# Patient Record
Sex: FEMALE | Race: WHITE | HISPANIC OR LATINO | Employment: UNEMPLOYED | URBAN - METROPOLITAN AREA
[De-identification: names, ages, dates, MRNs, and addresses within clinical notes are randomized per-mention and may not be internally consistent; named-entity substitution may affect disease eponyms.]

---

## 2024-01-19 ENCOUNTER — OFFICE VISIT (OUTPATIENT)
Dept: OBSTETRICS AND GYNECOLOGY | Facility: CLINIC | Age: 34
End: 2024-01-19
Payer: COMMERCIAL

## 2024-01-19 ENCOUNTER — LAB (OUTPATIENT)
Dept: LAB | Facility: LAB | Age: 34
End: 2024-01-19
Payer: COMMERCIAL

## 2024-01-19 VITALS
DIASTOLIC BLOOD PRESSURE: 76 MMHG | BODY MASS INDEX: 28.85 KG/M2 | HEIGHT: 64 IN | SYSTOLIC BLOOD PRESSURE: 134 MMHG | WEIGHT: 169 LBS

## 2024-01-19 DIAGNOSIS — Z34.91 FIRST TRIMESTER PREGNANCY (HHS-HCC): ICD-10-CM

## 2024-01-19 DIAGNOSIS — Z32.01 PREGNANCY TEST POSITIVE (HHS-HCC): ICD-10-CM

## 2024-01-19 DIAGNOSIS — Z32.00 ENCOUNTER FOR CONFIRMATION OF PREGNANCY TEST RESULT WITH PHYSICAL EXAMINATION: Primary | ICD-10-CM

## 2024-01-19 LAB
ABO GROUP (TYPE) IN BLOOD: NORMAL
ALBUMIN SERPL BCP-MCNC: 4.5 G/DL (ref 3.4–5)
ALP SERPL-CCNC: 58 U/L (ref 33–110)
ALT SERPL W P-5'-P-CCNC: 36 U/L (ref 7–45)
ANION GAP SERPL CALC-SCNC: 13 MMOL/L (ref 10–20)
ANTIBODY SCREEN: NORMAL
AST SERPL W P-5'-P-CCNC: 26 U/L (ref 9–39)
BILIRUB SERPL-MCNC: 0.4 MG/DL (ref 0–1.2)
BUN SERPL-MCNC: 17 MG/DL (ref 6–23)
CALCIUM SERPL-MCNC: 9.8 MG/DL (ref 8.6–10.6)
CHLORIDE SERPL-SCNC: 104 MMOL/L (ref 98–107)
CO2 SERPL-SCNC: 25 MMOL/L (ref 21–32)
CREAT SERPL-MCNC: 0.78 MG/DL (ref 0.5–1.05)
CRL: 14.6 MM
EGFRCR SERPLBLD CKD-EPI 2021: >90 ML/MIN/1.73M*2
ERYTHROCYTE [DISTWIDTH] IN BLOOD BY AUTOMATED COUNT: 12.3 % (ref 11.5–14.5)
GLUCOSE SERPL-MCNC: 92 MG/DL (ref 74–99)
HBV SURFACE AG SERPL QL IA: NONREACTIVE
HCT VFR BLD AUTO: 43.5 % (ref 36–46)
HCV AB SER QL: NONREACTIVE
HGB BLD-MCNC: 14.4 G/DL (ref 12–16)
HIV 1+2 AB+HIV1 P24 AG SERPL QL IA: NONREACTIVE
MCH RBC QN AUTO: 30.6 PG (ref 26–34)
MCHC RBC AUTO-ENTMCNC: 33.1 G/DL (ref 32–36)
MCV RBC AUTO: 93 FL (ref 80–100)
NRBC BLD-RTO: 0 /100 WBCS (ref 0–0)
PLATELET # BLD AUTO: 287 X10*3/UL (ref 150–450)
POTASSIUM SERPL-SCNC: 4.6 MMOL/L (ref 3.5–5.3)
PREGNANCY TEST URINE, POC: POSITIVE
PROT SERPL-MCNC: 6.7 G/DL (ref 6.4–8.2)
RBC # BLD AUTO: 4.7 X10*6/UL (ref 4–5.2)
REFLEX ADDED, ANEMIA PANEL: NORMAL
RH FACTOR (ANTIGEN D): NORMAL
RUBV IGG SERPL IA-ACNC: 2.6 IA
RUBV IGG SERPL QL IA: POSITIVE
SODIUM SERPL-SCNC: 137 MMOL/L (ref 136–145)
TREPONEMA PALLIDUM IGG+IGM AB [PRESENCE] IN SERUM OR PLASMA BY IMMUNOASSAY: NONREACTIVE
WBC # BLD AUTO: 8.6 X10*3/UL (ref 4.4–11.3)

## 2024-01-19 PROCEDURE — 36415 COLL VENOUS BLD VENIPUNCTURE: CPT

## 2024-01-19 PROCEDURE — 87389 HIV-1 AG W/HIV-1&-2 AB AG IA: CPT

## 2024-01-19 PROCEDURE — 85027 COMPLETE CBC AUTOMATED: CPT

## 2024-01-19 PROCEDURE — 87340 HEPATITIS B SURFACE AG IA: CPT

## 2024-01-19 PROCEDURE — 86901 BLOOD TYPING SEROLOGIC RH(D): CPT

## 2024-01-19 PROCEDURE — 86803 HEPATITIS C AB TEST: CPT

## 2024-01-19 PROCEDURE — 76817 TRANSVAGINAL US OBSTETRIC: CPT | Performed by: OBSTETRICS & GYNECOLOGY

## 2024-01-19 PROCEDURE — 86780 TREPONEMA PALLIDUM: CPT

## 2024-01-19 PROCEDURE — 81025 URINE PREGNANCY TEST: CPT | Performed by: OBSTETRICS & GYNECOLOGY

## 2024-01-19 PROCEDURE — 86317 IMMUNOASSAY INFECTIOUS AGENT: CPT

## 2024-01-19 PROCEDURE — 1036F TOBACCO NON-USER: CPT | Performed by: OBSTETRICS & GYNECOLOGY

## 2024-01-19 PROCEDURE — 86850 RBC ANTIBODY SCREEN: CPT

## 2024-01-19 PROCEDURE — 86900 BLOOD TYPING SEROLOGIC ABO: CPT

## 2024-01-19 PROCEDURE — 99203 OFFICE O/P NEW LOW 30 MIN: CPT | Performed by: OBSTETRICS & GYNECOLOGY

## 2024-01-19 PROCEDURE — 80053 COMPREHEN METABOLIC PANEL: CPT

## 2024-01-19 RX ORDER — CYANOCOBALAMIN (VITAMIN B-12) 250 MCG
250 TABLET ORAL DAILY
COMMUNITY

## 2024-01-19 RX ORDER — ACETAMINOPHEN 500 MG
TABLET ORAL
COMMUNITY

## 2024-01-19 NOTE — PROGRESS NOTES
ASSESSMENT/PLAN  Encounter for confirmation of pregnancy test result with physical examination    - US OB transvaginal. Today's ultrasound confirms a single viable gestation measuring 7 6/7 wks which is consistent with LMP dating of 8 0/7 wks.   - POCT pregnancy, urine manually resulted    Prenatal labs ordered  - CBC Anemia Panel With Reflex,Pregnancy; Future  - Hepatitis B Surface Antigen; Future  - Hepatitis C Antibody; Future  - HIV 1/2 Antigen/Antibody Screen with Reflex to Confirmation; Future  - Rubella Antibody, IgG; Future  - Syphilis Screen with Reflex; Future  - Type And Screen; Future  - Comprehensive Metabolic Panel; Future d/t h/o connective tissue.     RTO 2-3 wks for new OB visit.  Plan to start  mg daily at 12 weeks for h/o connective tissue disease.        SUBJECTIVE    HPI    32 yo  LMP 2023 8 0/7 wks presents for pregnancy confirmation. Cycles regular, no bleeding since LMP.  Some nausea.    Previous two pregnancies [facility].  section x 2.  First pregnancy  IOL 39 weeks persistent cat 2 tracing per op note.   pregnancy planned repeat c/s. Presented c/o ctx, sent home and returned with advanced dilation. Repeat LTCS.    Pt interested in TOLAC. We discussed if she really wants TOLAC after 2 c/s needs delivery at [facility]. We discussed risks, benefits. After discussion, pt elects repeat c/s this pregnancy.     H/o Sgrogens diagnosed 10 years ago. JATINDER+, SSA+  In past followed by rheumatology. Per pt took asa last pregnancy but no fetal growth issues or CTD effects on pregnancies in past per pt.      Review of Systems    Constitutional: no fever, no chills, + recent weight gain, no recent weight loss and + fatigue.   Eyes: no eye pain, no vision problems and no dryness of the eyes.   ENT: no hearing loss, no nosebleeds and no sinus congestion.   Cardiovascular: no chest pain, no palpitations and no orthopnea.   Respiratory: no shortness of breath, no cough and no  "wheezing.   Gastrointestinal: no abdominal pain, no constipation, + nausea, no diarrhea and no vomiting.   Genitourinary: no pelvic pain, no dysuria, no urinary incontinence, no vaginal dryness, no vaginal itching, no dyspareunia, no dysmenorrhea, no sexual problems, + urinary frequency, no vaginal discharge, no unexplained vaginal bleeding and no lesion/sore.   Musculoskeletal: no back pain, no joint swelling and no leg edema.   Integumentary: no rashes, no skin lesions, no breast pain, no nipple discharge and no breast lump.   Neurological: no headache, no numbness and no dizziness.   Psychiatric: no sleep disturbances, no anxiety and no depression.   Endocrine: no hot flashes, no loss of hair and no hirsutism.   Hematologic/Lymphatic: no swollen glands, no tendency for easy bleeding and no tendency for easy bruising.      OBJECTIVE    /76   Ht 1.626 m (5' 4\")   Wt 76.7 kg (169 lb)   LMP 11/24/2023 (Exact Date)   BMI 29.01 kg/m²     IO transvaginal ultrasound confirms single viable IUP CRL 1.46 cm 7 6/7 wks, + FCA, +YS  Physical Exam     Constitutional: Alert and in no acute distress. Well developed, well nourished   Abdomen: soft nontender; no abdominal mass palpated, no organomegaly and no hernias   Genitourinary: external genitalia: normal.   Uterus: Normal, mobile, nontender.  Right Adnexa/parametria: Normal.   Left Adnexa/parametria: Normal.   Psychiatric: alert and oriented x 3., affect normal to patient baseline and mood: appropriate       Yulisa Sheth MD  "

## 2024-01-19 NOTE — PATIENT INSTRUCTIONS
You were seen in the office today for confirmation of pregnancy. Your baby measured 7 6/7 wks on ultrasound which is consistent with your LMP dating.   Continue routine OB precautions at home  Continue taking prenatal vitamins. If you have not started prenatal vitamins you can start them now. Chewable and gummy prenatal vitamins are fine if it is difficult for you to swallow pills. Prenatal vitamins should have at least 800 mcg of Folic Acid to reduce the risk of neural tube/spinal defects in the baby  Routine prenatal lab work was ordered for you today and needs to be done prior to your next visit. These can be done at any  outpatient laboratory without an appointment, and do not require fasting.  Optional lab work to screen for genetic disorders can also be ordered. If these were discussed and/or ordered today the results will come separately from the rest of your routine labs, generally within 7-9 business days, and we will call you with these results. If you are still considering these labs we are happy to provide more information for review at home, and can answer any additional questions/order at the next office visit.  Avoid sick contacts and consider getting your Flu (available in office during flu season) and COVID vaccines to protect against infection in pregnancy  Make an appointment for a New OB visit in the office in the next 2-3 weeks  If you are having any concerns prior to your next visit please call the office to speak to the physician on call. This includes after hours, weekends, and holidays, when the answering service will be able to connect you with the physician on call. [phone] ([place] [facility]) or [phone] [place] [facility].

## 2024-02-04 RX ORDER — ASPIRIN 81 MG/1
162 TABLET ORAL DAILY
COMMUNITY

## 2024-02-06 ENCOUNTER — LAB (OUTPATIENT)
Dept: LAB | Facility: LAB | Age: 34
End: 2024-02-06
Payer: COMMERCIAL

## 2024-02-06 ENCOUNTER — INITIAL PRENATAL (OUTPATIENT)
Dept: OBSTETRICS AND GYNECOLOGY | Facility: CLINIC | Age: 34
End: 2024-02-06
Payer: COMMERCIAL

## 2024-02-06 VITALS — SYSTOLIC BLOOD PRESSURE: 128 MMHG | BODY MASS INDEX: 29.18 KG/M2 | DIASTOLIC BLOOD PRESSURE: 60 MMHG | WEIGHT: 170 LBS

## 2024-02-06 DIAGNOSIS — Z34.91 INITIAL OBSTETRIC VISIT, FIRST TRIMESTER (HHS-HCC): Primary | ICD-10-CM

## 2024-02-06 DIAGNOSIS — O35.9XX0 SUSPECTED FETAL ANOMALY, ANTEPARTUM, SINGLE OR UNSPECIFIED FETUS (HHS-HCC): ICD-10-CM

## 2024-02-06 DIAGNOSIS — Z36.9 FIRST TRIMESTER SCREENING (HHS-HCC): ICD-10-CM

## 2024-02-06 DIAGNOSIS — Z34.81 SUPERVISION OF NORMAL INTRAUTERINE PREGNANCY IN MULTIGRAVIDA IN FIRST TRIMESTER (HHS-HCC): ICD-10-CM

## 2024-02-06 DIAGNOSIS — Z3A.09 9 WEEKS GESTATION OF PREGNANCY (HHS-HCC): ICD-10-CM

## 2024-02-06 DIAGNOSIS — Z12.4 ROUTINE CERVICAL SMEAR: ICD-10-CM

## 2024-02-06 DIAGNOSIS — Z11.3 SCREEN FOR STD (SEXUALLY TRANSMITTED DISEASE): ICD-10-CM

## 2024-02-06 LAB
POC BLOOD, URINE: NEGATIVE
POC GLUCOSE, URINE: NEGATIVE MG/DL
POC KETONES, URINE: NEGATIVE MG/DL
POC LEUKOCYTES, URINE: ABNORMAL
POC NITRITE,URINE: NEGATIVE
POC PROTEIN, URINE: NEGATIVE MG/DL

## 2024-02-06 PROCEDURE — 87491 CHLMYD TRACH DNA AMP PROBE: CPT

## 2024-02-06 PROCEDURE — 36415 COLL VENOUS BLD VENIPUNCTURE: CPT

## 2024-02-06 PROCEDURE — 87800 DETECT AGNT MULT DNA DIREC: CPT

## 2024-02-06 PROCEDURE — 87591 N.GONORRHOEAE DNA AMP PROB: CPT

## 2024-02-06 PROCEDURE — 88175 CYTOPATH C/V AUTO FLUID REDO: CPT

## 2024-02-06 PROCEDURE — 0500F INITIAL PRENATAL CARE VISIT: CPT | Performed by: OBSTETRICS & GYNECOLOGY

## 2024-02-06 PROCEDURE — 87086 URINE CULTURE/COLONY COUNT: CPT

## 2024-02-06 PROCEDURE — 87624 HPV HI-RISK TYP POOLED RSLT: CPT

## 2024-02-06 NOTE — PROGRESS NOTES
Subjective   Patient ID [id number]   Shannon Wright is a 33 y.o.  at 10 4/7 wks with a working estimated date of delivery of 2024 who presents for an initial prenatal visit.   Overall feeling well.     Previous two pregnancies [facility].  section x 2.  First pregnancy  IOL 39 weeks persistent cat 2 tracing per op note.   pregnancy planned repeat c/s. Presented c/o ctx, sent home and returned with advanced dilation. Repeat LTCS.  After discussion last visit, pt elects repeat c/s this pregnancy.     H/o Sjogrens diagnosed 10 years ago. JATINDER+, SSA+  In past followed by rheumatology. Per pt took asa last pregnancy but no fetal growth issues or CTD effects on pregnancies in past per pt.    Her pregnancy is complicated by:  AMA at delivery  Prior c/s x 2  H/o Sjogrens     OB History    Para Term  AB Living   3 2 2 0 0 2   SAB IAB Ectopic Multiple Live Births   0 0 0 0 2      # Outcome Date GA Lbr John/2nd Weight Sex Delivery Anes PTL Lv   3 Current            2 Term 10/07/21 38w6d  3.58 kg M CS-LTranv Spinal N RACHEL   1 Term 19 39w1d  3.23 kg F CS-LTranv EPI N RACHEL          Objective   Physical Exam  See prenatal physical  Weight: 77.1 kg (170 lb)  Expected Total Weight Gain: 7 kg (15 lb)-11.5 kg (25 lb)   Pregravid BMI: 28.99  BP: 128/60    IO transvaginal ultrasound confirms single viable IUP CRL 10 6/7 wks, + FCA, + YS    Assessment/Plan   10 4/7 wks new OB visit.   AMA at term.   First trimester screening and second trimester screening discussed. Patient decided to proceed with first trimester NT ultrasound and NIPS, Prequel.   Pap/HPV test done today.  GC, CT, urine culture done today.   Normal prenatal labs reviewed.   mg starting 12 weeks.   Follow up in 4 weeks for return OB visit.    Yulisa Sheth MD

## 2024-02-06 NOTE — PATIENT INSTRUCTIONS
Welcome to prenatal care with [facility]!  You were seen in the office today for your initiation to prenatal care and had normal findings on exam  Continue routine OB precautions at home  Your labs were reviewed today and were normal. Routine pelvic cultures, urine culture, and pap smear (if you were due) were done today and you will be notified of the results  If you have had genetic screening labs done, we usually receive the results from allyve within 7-9 business days and we will call you with the results.   Continue taking prenatal vitamins   Avoid sick contacts and consider getting your Flu (available in office during flu season) and COVID vaccines to protect against infection in pregnancy    What to expect:  -    You will see each of our physicians at least once during your prenatal care. There are 5 physicians (Elizabeth Sheth, Narda, Paul, Ariel, and Evangelista) and our NP Eve Perry. We rotate OB call to be able to provide the best care to our patients during this important time, and we want to make sure you have had a chance to meet each physician prior to coming in for labor.   -    We will see you in the office every 4 weeks in the first two trimesters, every 2 weeks between 30 and 36 weeks gestation, and weekly following 36 weeks. Anatomy ultrasounds are done at [facility] around 20 weeks, gestational diabetes and anemia screening is done through outpatient labs between 25 and 28 weeks gestation, and labor and delivery preparations (ultrasound to confirm presentation, consent forms, etc) are done at 36 weeks in the office.   -    Visits can seem quick, but provide us with quite a bit of important information. So it is important to try not to miss any visits if possible and make up any cancelled appointments as soon as possible. Make sure you come to each visit with a full bladder because urine testing for bacteria, glucose, and protein are done at each visit. And although the OB visits may seem quick,  we are happy to take the time to answer any questions or discuss any concerns you may have  -    We deliver at [facility]: [address] [place], [address]  -    Birth, lactation, and parenting classes, as well as scheduling for hospital tours can be done through [email or web address].       Make an appointment for routine care in the office in the next 4 weeks  If you are having any bleeding, pain, severe nausea and/or vomiting, or any other concerns prior to your next visit, please call the office to speak to the physician on call. This includes after hours, weekends, and holidays, when the answering service will be able to connect you with the physician on call. [phone] ([place] [facility]) or [phone] ([place] [facility]).    Congratulations, we are excited to partner with you in the care of your pregnancy!

## 2024-02-07 LAB
BACTERIA UR CULT: NO GROWTH
C TRACH RRNA SPEC QL NAA+PROBE: NEGATIVE
N GONORRHOEA DNA SPEC QL PROBE+SIG AMP: NEGATIVE

## 2024-02-19 LAB — SCAN RESULT: NORMAL

## 2024-02-23 ENCOUNTER — HOSPITAL ENCOUNTER (OUTPATIENT)
Dept: RADIOLOGY | Facility: CLINIC | Age: 34
Discharge: HOME | End: 2024-02-23
Payer: COMMERCIAL

## 2024-02-23 ENCOUNTER — APPOINTMENT (OUTPATIENT)
Dept: RADIOLOGY | Facility: CLINIC | Age: 34
End: 2024-02-23
Payer: COMMERCIAL

## 2024-02-23 DIAGNOSIS — Z36.9 FIRST TRIMESTER SCREENING (HHS-HCC): ICD-10-CM

## 2024-02-23 PROCEDURE — 76813 OB US NUCHAL MEAS 1 GEST: CPT | Performed by: OBSTETRICS & GYNECOLOGY

## 2024-02-23 PROCEDURE — 76813 OB US NUCHAL MEAS 1 GEST: CPT

## 2024-03-06 ENCOUNTER — ROUTINE PRENATAL (OUTPATIENT)
Dept: OBSTETRICS AND GYNECOLOGY | Facility: CLINIC | Age: 34
End: 2024-03-06
Payer: COMMERCIAL

## 2024-03-06 VITALS — SYSTOLIC BLOOD PRESSURE: 100 MMHG | BODY MASS INDEX: 29.52 KG/M2 | WEIGHT: 172 LBS | DIASTOLIC BLOOD PRESSURE: 60 MMHG

## 2024-03-06 DIAGNOSIS — Z3A.14 14 WEEKS GESTATION OF PREGNANCY (HHS-HCC): ICD-10-CM

## 2024-03-06 DIAGNOSIS — Z34.82 MULTIGRAVIDA IN SECOND TRIMESTER (HHS-HCC): Primary | ICD-10-CM

## 2024-03-06 LAB
POC GLUCOSE, URINE: NEGATIVE MG/DL
POC PROTEIN, URINE: NEGATIVE MG/DL

## 2024-03-06 PROCEDURE — 0501F PRENATAL FLOW SHEET: CPT | Performed by: OBSTETRICS & GYNECOLOGY

## 2024-03-06 RX ORDER — FOLIC ACID 1 MG/1
TABLET ORAL DAILY
COMMUNITY

## 2024-03-06 NOTE — PROGRESS NOTES
Subjective   Patient ID [id number]   Shannon Wright is a 33 y.o.  at 14w5d with a working estimated date of delivery of 2024, by Last Menstrual Period who presents for a routine prenatal visit. She denies vaginal bleeding, leakage of fluid, decreased fetal movements, or contractions.    Her pregnancy is complicated by:  Sjogrens  LTCS x 2  AMA    Objective   Physical Exam  Weight: 78 kg (172 lb)  Expected Total Weight Gain: 7 kg (15 lb)-11.5 kg (25 lb)   Pregravid BMI: 28.99  BP: 100/60         Prenatal Labs  Urine dip:  Lab Results   Component Value Date    KETONESU NEGATIVE 2024       Lab Results   Component Value Date    HGB 14.4 2024    HCT 43.5 2024    ABO O 2024    HEPBSAG Nonreactive 2024           Imagin weeks for anatomy    Assessment/Plan   Problem List Items Addressed This Visit    None  Visit Diagnoses         Codes    Multigravida in second trimester    -  Primary Z34.82    Patient doing well  Precautions given  RTO 4 weeks     14 weeks gestation of pregnancy     Z3A.14    Relevant Orders    POCT UA Automated manually resulted (Completed)            Continue prenatal vitamin.  Labs reviewed.  Rhogam not indicated  GTT 25-28 weeks.    Follow up in 4 weeks for a routine prenatal visit.  Jenny Blake DO

## 2024-03-06 NOTE — PATIENT INSTRUCTIONS
You were seen in the office today for routine OB care and had normal findings on exam  Continue routine OB precautions at home  Continue taking prenatal vitamins   Avoid sick contacts and consider getting your Flu (available in office during flu season) and COVID vaccines to protect against infection in pregnancy  You will be given an order for your anatomy ultrasound. Please schedule at [facility] between 19 and 22 weeks gestation [phone]  You will be given a bottle of Glucola and orders for your second trimester labs. Please complete these between 25 and 28 weeks gestation. You may complete these at any  outpatient lab, and do not need an appointment  Make an appointment for routine care in the office in the next 4 weeks  If you are having any concerns prior to your next visit please call the office to speak to the physician on call. This includes after hours, weekends, and holidays, when the answering service will be able to connect you with the physician on call. [phone] ([place] [facility]) or [phone] [place].

## 2024-04-05 ENCOUNTER — HOSPITAL ENCOUNTER (OUTPATIENT)
Dept: RADIOLOGY | Facility: CLINIC | Age: 34
Discharge: HOME | End: 2024-04-05
Payer: COMMERCIAL

## 2024-04-05 ENCOUNTER — ROUTINE PRENATAL (OUTPATIENT)
Dept: OBSTETRICS AND GYNECOLOGY | Facility: CLINIC | Age: 34
End: 2024-04-05
Payer: COMMERCIAL

## 2024-04-05 VITALS — SYSTOLIC BLOOD PRESSURE: 122 MMHG | BODY MASS INDEX: 28.32 KG/M2 | WEIGHT: 165 LBS | DIASTOLIC BLOOD PRESSURE: 80 MMHG

## 2024-04-05 DIAGNOSIS — Z34.82 ENCOUNTER FOR SUPERVISION OF OTHER NORMAL PREGNANCY IN SECOND TRIMESTER (HHS-HCC): Primary | ICD-10-CM

## 2024-04-05 DIAGNOSIS — Z3A.19 19 WEEKS GESTATION OF PREGNANCY (HHS-HCC): ICD-10-CM

## 2024-04-05 DIAGNOSIS — O35.9XX0 SUSPECTED FETAL ANOMALY, ANTEPARTUM, SINGLE OR UNSPECIFIED FETUS (HHS-HCC): ICD-10-CM

## 2024-04-05 DIAGNOSIS — M35.09 SJOGREN'S SYNDROME WITH OTHER ORGAN INVOLVEMENT (MULTI): Primary | ICD-10-CM

## 2024-04-05 LAB
POC GLUCOSE, URINE: NEGATIVE MG/DL
POC PROTEIN, URINE: ABNORMAL MG/DL

## 2024-04-05 PROCEDURE — 76811 OB US DETAILED SNGL FETUS: CPT

## 2024-04-05 PROCEDURE — 76805 OB US >/= 14 WKS SNGL FETUS: CPT | Performed by: OBSTETRICS & GYNECOLOGY

## 2024-04-05 PROCEDURE — 0501F PRENATAL FLOW SHEET: CPT | Performed by: OBSTETRICS & GYNECOLOGY

## 2024-04-05 NOTE — PROGRESS NOTES
Subjective   Shannon Wright is a 33 y.o.  at 19w0d, presents for a routine prenatal visit.   Pts younger sister  suddenly and tragically in a car accident several weeks ago.     Objective     /80   Wt 74.8 kg (165 lb)   LMP 2023 (Exact Date)   BMI 28.32 kg/m²   Informal ultrasound + FCA    Plan  19 0/7 weeks  Anatomy ultrasound today.  Plan for fetal ECHO d/t h/o Sjogrens  RTO 4 weeks    Yulisa Sheth MD    4/10/2024   Left message for pt regarding positive anti SSA consistent with reported h/o Sjrogens.   Fetal ECHO ordered.

## 2024-04-09 ENCOUNTER — TELEPHONE (OUTPATIENT)
Dept: OBSTETRICS AND GYNECOLOGY | Facility: HOSPITAL | Age: 34
End: 2024-04-09
Payer: COMMERCIAL

## 2024-04-09 ENCOUNTER — LAB (OUTPATIENT)
Dept: LAB | Facility: LAB | Age: 34
End: 2024-04-09
Payer: COMMERCIAL

## 2024-04-09 DIAGNOSIS — M35.09 SJOGREN'S SYNDROME WITH OTHER ORGAN INVOLVEMENT (MULTI): ICD-10-CM

## 2024-04-09 LAB
ENA SS-A AB SER IA-ACNC: 7.2 AI
ENA SS-B AB SER IA-ACNC: <0.2 AI

## 2024-04-09 PROCEDURE — 86235 NUCLEAR ANTIGEN ANTIBODY: CPT

## 2024-04-09 PROCEDURE — 36415 COLL VENOUS BLD VENIPUNCTURE: CPT

## 2024-04-10 ENCOUNTER — TELEPHONE (OUTPATIENT)
Dept: OBSTETRICS AND GYNECOLOGY | Facility: HOSPITAL | Age: 34
End: 2024-04-10
Payer: COMMERCIAL

## 2024-04-12 ENCOUNTER — HOSPITAL ENCOUNTER (OUTPATIENT)
Dept: PEDIATRIC CARDIOLOGY | Facility: HOSPITAL | Age: 34
Discharge: HOME | End: 2024-04-12
Payer: COMMERCIAL

## 2024-04-12 ENCOUNTER — OFFICE VISIT (OUTPATIENT)
Dept: PEDIATRIC CARDIOLOGY | Facility: HOSPITAL | Age: 34
End: 2024-04-12
Payer: COMMERCIAL

## 2024-04-12 VITALS
HEIGHT: 64 IN | BODY MASS INDEX: 29.4 KG/M2 | HEART RATE: 87 BPM | SYSTOLIC BLOOD PRESSURE: 106 MMHG | DIASTOLIC BLOOD PRESSURE: 71 MMHG | WEIGHT: 172.18 LBS

## 2024-04-12 DIAGNOSIS — O28.3 ABNORMAL FETAL ULTRASOUND: ICD-10-CM

## 2024-04-12 DIAGNOSIS — O35.BXX0 ABNORMAL FETAL ECHOCARDIOGRAPHY AFFECTING ANTEPARTUM CARE OF MOTHER, SINGLE OR UNSPECIFIED FETUS (HHS-HCC): Primary | ICD-10-CM

## 2024-04-12 DIAGNOSIS — O35.8XX0 MATERNAL CARE FOR OTHER (SUSPECTED) FETAL ABNORMALITY AND DAMAGE, NOT APPLICABLE OR UNSPECIFIED (HHS-HCC): ICD-10-CM

## 2024-04-12 PROCEDURE — 99214 OFFICE O/P EST MOD 30 MIN: CPT | Performed by: PEDIATRICS

## 2024-04-12 PROCEDURE — 76827 ECHO EXAM OF FETAL HEART: CPT | Performed by: PEDIATRICS

## 2024-04-12 PROCEDURE — 99204 OFFICE O/P NEW MOD 45 MIN: CPT | Performed by: PEDIATRICS

## 2024-04-12 PROCEDURE — 93325 DOPPLER ECHO COLOR FLOW MAPG: CPT | Performed by: PEDIATRICS

## 2024-04-12 PROCEDURE — 76827 ECHO EXAM OF FETAL HEART: CPT

## 2024-04-12 NOTE — LETTER
April 15, 2024     Alejo Ponce MD  [id number]  [facility] [address]    Patient: Shannon Wright   YOB: 1990   Date of Visit: 2024       Dear Dr. Alejo Ponce MD:    Thank you for referring Shannon Wright to me for evaluation. Below are my notes for this consultation.  If you have questions, please do not hesitate to call me. I look forward to following your patient along with you.       Sincerely,     Ty Reyna MD      CC: Isabelle Franks, APRN-CNM, DNP  Amber Jones, APRN-CNP  Yulisa Sheth MD  ______________________________________________________________________________________    Shannon Wright was seen at the request of Alejo Ponce for a chief complaint of Sjogren syndrome and anti-SSA antibody positivity; a report with my findings is being sent via written or electronic means the referring physician with my recommendations for treatment.     I had the pleasure of seeing Shannon Wright in Pediatric Cardiology consultation at our [facility] location as part of our prenatal heart program for Sjogren syndrome and anti-SSA antibody positivity.  She is a 33 y.o. year-old  woman, currently 20w0d weeks gestation.  Patient's last menstrual period was 2023 (exact date). Estimated Date of Delivery: 24.  There have been no pregnancy complications.   She has not been hospitalized during this pregnancy.  She had a NIPT, which was normal.  She had a second trimester ultrasound which was normal.      Prior to the visit, I personally reviewed the cardiac portions of the obstetrical ultrasound performed on .  There is normal segmental anatomy with normal 4 chamber, outflow tract and 3 vessel views.  There is no evidence of septation defect, right or left ventricular outflow obstruction or significant valvular regurgitation.    Her previous obstetrical history is significant for 2 full term deliveries.  Her past medical history is significant for  "Sjogren syndrome anti-SSA antibody positivity.  She has no history of congenital heart disease, arrhythmia, cardiomyopathy, hypercholesterolemia, hypertension, diabetes, rheumatic heart disease, cancer, asthma, lupus, clotting disorder, depression, anxiety, alcohol abuse, phenylketonuria, or DiGeorge.  She has had 2 c-sections.  She takes Aspirin, vitamin D, B12, calcium, and folic acid.  She has No Known Allergies. She is currently taking prenatal vitamins.      Her family history is negative for early atherosclerosis, sudden cardiac death, long QT syndrome, cardiomyopathy, or metabolic disease.  Her father is 75yrs old and has an aortic aneurysm. She has a cousin that passed away from a PE and was found to have a clotting disorder. She has 2 maternal aunts with Sjogren syndrome and 1 maternal aunt with lupus. She has a cousin that has a 5 week baby that has a double aortic arch and recently had surgery at [facility].    She currently lives with her spouse and 2 children and is .  She works as a .  She does not smoke.  She denies illicit drug use or alcohol abuse.  She denies verbal, sexual, or physical abuse.     Delivery Hospital: [facility]  Father of the baby's name: same    /71 (BP Location: Right arm, Patient Position: Sitting, BP Cuff Size: Large adult)   Pulse 87   Ht 1.625 m (5' 3.98\")   Wt 78.1 kg (172 lb 2.9 oz)   LMP 11/24/2023 (Exact Date)   BMI 29.58 kg/m²     She was resting comfortably in the examination room and alert, active and in no respiratory distress. Skin was without rash.  HEENT: moist mucous membranes, no JVD, goiter. Breathing is not labored.  She was acyanotic.  There was no peripheral edema.   The abdomen was gravid, soft, nontender with normal bowel sounds.  The liver was not palpable.  The spleen tip was not palpable.  She had a normal gait and normal strength in all extremities.  Cranial nerves II - XII are intact.  She had no clubbing, cyanosis, or " edema.    A two-dimensional and Doppler fetal echocardiogram was performed today and interpreted by me at 20w0d weeks gestation.  The fetal echocardiogram showed normal segmental anatomy with no structural abnormalities found.  There is normal cardiac function.  There is no evidence of septation defect, right or left ventricular outflow obstruction or significant valvular regurgitation.  The fetal heart rate was within normal limits without ectopy or arrhythmia seen.  The spectral Doppler pattern across all valves, venous structures, and arterial structures was within normal limits.  There is no pericardial effusion.  Please see full report for details.    In summary, Shannon Wright is a 33 y.o. [id number], currently 20w0d weeks gestation, who had a normal fetal echocardiogram at today's visit.  She has a history of positive anti-SSA antibodies. She has normal active thyroid.  She has no other children with complete heart block.  There is an increased risk for fetal/ lupus in the setting of hypothyroid.  There is a significant risk of fetal heart block in the setting of a previous child with heart block.  Though somewhat controversial, the American Heart Association recently recommended every other week screening fetal echocardiograms between 16-28 weeks.  Given the history, I recommend repeat fetal echocardiography every other week.  We did not prescribe any medications.  We did not recommend intervention.  As always, we recommend a heart healthy lifestyle.  She does not necessarily need to follow up with pediatric cardiology after the baby is born unless the pediatric team has any concerns or worries.     The exact prevalence of symptomatic or asymptomatic maternal autoantibody (anti-Ro/SSA or anti-La/SSB) positivity in the general population is unknown. In prospectively examined pregnancies of mothers with known antibodies and no prior affected child, the reported incidence of fetal CHB was between 1%  and 5%. The number of affected pregnancies increases to 11% to 19% for those with a previously affected child with CHB.  In addition, women with both autoantibodies and hypothyroidism are at a 9-fold increased risk of having an affected fetus or  compared with those with SSA or SSB alone.   In addition to abnormalities in the conduction system, up to 10% to 15% of SSA-exposed fetuses with conduction system disease may also develop myocardial inflammation, endocardial fibroelastosis, or atrioventricular (AV) valve apparatus dysfunction.  Because of the perception that the inflammatory effects resulting from antibody exposure may be preventable if detected and treated at an early stage, it has been recommended that SSA/SSB-positive women be referred for fetal echocardiography surveillance beginning in the early second trimester (16-18 weeks). The mechanical NY interval has been measured in fetuses at risk with the use of a variety of M-mode and pulsed Doppler techniques and compared with gestational age-adjusted normal values. Although the value of serial assessment for the detection of the progression of myocardial inflammation or conduction system disease from first-degree block (NY prolongation) to CHB has not been proved, serial assessment at 1- to 2-week intervals starting at 16 weeks and continuing through 28 weeks of gestation is reasonable to perform because the potential benefits outweigh the risks. For women who have had a previously affected child, more frequent serial assessment, at least weekly, is recommended. (Claire M, et al. Circulation. 2014;129:1-58)     LOC: 0  Normal fetal echocardiogram within the limitations of ultrasound. No changes were made to current delivery plan. Triage code 0:  Delivery per OB at patient's preferred hospital.  Standard  care per  team.  Cardiology consult not necessary, unless there are clinical concerns.       Thank you for allowing me to  participate in Shannon's care.  If you have any further questions, please do not hesitate to contact me.     Ty Reyna M.D.  [facility], Director  Ambulatory Pediatric Cardiology   Division of Pediatric Cardiology  [facility]  [facility]   of Pediatrics, [facility]  [facility] -   [address] [place] [address]  [place] [id number]  Office:  [phone]  Fax:       [phone]  e-mail:  [email or web address]    I spent greater than 45 minutes in performance of this consultation, of which greater than 50% was related to coordination of care or counseling.

## 2024-04-12 NOTE — PATIENT INSTRUCTIONS
Women with SSa Antibodies are at risk for having babies with complete heart block.  Complete heart block (aka CHB) happens because antibodies attack the electrical system of the heart.  This results in the upper and lower chambers not talking with each other and beating at different times.  There is no sign of heart block at this time.  SSa antibodies are also associated with leakage of the heart valves (regurgitation), brightness and scarring of the pumping chambers (endocardiofibroelastosis), decreased squeeze (ventricular dysfunction) or fluid around the heart (pericardial effusion).  Your fetus showed none of these findings. You had a normal fetal echocardiogram today.  The baby's heart is built normally.  The baby's heart rhythm is normal.  The function of the heart is normal.      There is some controversy about whether or not it is appropriate to have repeated fetal echocardiograms on women with SSA/SSB anti-bodies.  The American Heart Association suggests that it is reasonable to do either weekly or every other week fetal echocardiograms to look for congenital heart block.    The risk of developing heart block is higher if you had a previous child with heart block or if you have hypothyroid or vitamin D deficiency.  At this point I recommend weekly Doppler auscultation of the fetal heart at your OB's office until 28 weeks and a repeat fetal echocardiogram in 2 weeks.  Otherwise, you can deliver at the hospital of your choosing.  The baby does not need to see a cardiologist after birth unless the pediatric team has any concerns.      Sometimes it is not possible to see all the heart structures because of the position or size of the fetus. This does not mean they are not there, but may mean that for technical reasons they cannot be assessed. Sometimes this information may not be important; while in some cases it means that definite answers are not possible. The echocardiographer will discuss this with you  if necessary, and repeat studies are frequently performed later in the pregnancy. Certain congenital heart abnormalities are also hard to detect by fetal echocardiograms, but often these are simple abnormalities. We do not mention this to concern you, but rather that you understand that there are technical limitations to such studies.   It remains important that your pediatrician provides a normal careful medical examination of the baby (including the heart) takes place after birth, and if there were any suspicious cardiac findings, that these are evaluated in the usual way irrespective of the findings at fetal study. Certain communications between the two sides of the circulation are normally present in all developing babies and normally close after birth. We are not able to tell in advance whether this will occur, however there is only a tiny chance that they will not. Persistence of these structures is generally not a difficult problem to deal with.     We direct our attention only to the heart, where we have special expertise. This is not the same as your general obstetric ultrasound scan. Other ultrasound information about the fetus can be obtained from an obstetric ultrasonographer or your obstetrician.

## 2024-04-12 NOTE — PROGRESS NOTES
Shannon Wright was seen at the request of Alejo Ponce for a chief complaint of Sjogren syndrome and anti-SSA antibody positivity; a report with my findings is being sent via written or electronic means the referring physician with my recommendations for treatment.     I had the pleasure of seeing Shannon Wright in Pediatric Cardiology consultation at our [facility] location as part of our prenatal heart program for Sjogren syndrome and anti-SSA antibody positivity.  She is a 33 y.o. year-old  woman, currently 20w0d weeks gestation.  Patient's last menstrual period was 2023 (exact date). Estimated Date of Delivery: 24.  There have been no pregnancy complications.   She has not been hospitalized during this pregnancy.  She had a NIPT, which was normal.  She had a second trimester ultrasound which was normal.      Prior to the visit, I personally reviewed the cardiac portions of the obstetrical ultrasound performed on .  There is normal segmental anatomy with normal 4 chamber, outflow tract and 3 vessel views.  There is no evidence of septation defect, right or left ventricular outflow obstruction or significant valvular regurgitation.    Her previous obstetrical history is significant for 2 full term deliveries.  Her past medical history is significant for Sjogren syndrome anti-SSA antibody positivity.  She has no history of congenital heart disease, arrhythmia, cardiomyopathy, hypercholesterolemia, hypertension, diabetes, rheumatic heart disease, cancer, asthma, lupus, clotting disorder, depression, anxiety, alcohol abuse, phenylketonuria, or DiGeorge.  She has had 2 c-sections.  She takes Aspirin, vitamin D, B12, calcium, and folic acid.  She has No Known Allergies. She is currently taking prenatal vitamins.      Her family history is negative for early atherosclerosis, sudden cardiac death, long QT syndrome, cardiomyopathy, or metabolic disease.  Her father is 75yrs old and has an aortic  "aneurysm. She has a cousin that passed away from a PE and was found to have a clotting disorder. She has 2 maternal aunts with Sjogren syndrome and 1 maternal aunt with lupus. She has a cousin that has a 5 week baby that has a double aortic arch and recently had surgery at [facility].    She currently lives with her spouse and 2 children and is .  She works as a .  She does not smoke.  She denies illicit drug use or alcohol abuse.  She denies verbal, sexual, or physical abuse.     Delivery [facility]  Father of the baby's name: same    /71 (BP Location: Right arm, Patient Position: Sitting, BP Cuff Size: Large adult)   Pulse 87   Ht 1.625 m (5' 3.98\")   Wt 78.1 kg (172 lb 2.9 oz)   LMP 11/24/2023 (Exact Date)   BMI 29.58 kg/m²     She was resting comfortably in the examination room and alert, active and in no respiratory distress. Skin was without rash.  HEENT: moist mucous membranes, no JVD, goiter. Breathing is not labored.  She was acyanotic.  There was no peripheral edema.   The abdomen was gravid, soft, nontender with normal bowel sounds.  The liver was not palpable.  The spleen tip was not palpable.  She had a normal gait and normal strength in all extremities.  Cranial nerves II - XII are intact.  She had no clubbing, cyanosis, or edema.    A two-dimensional and Doppler fetal echocardiogram was performed today and interpreted by me at 20w0d weeks gestation.  The fetal echocardiogram showed normal segmental anatomy with no structural abnormalities found.  There is normal cardiac function.  There is no evidence of septation defect, right or left ventricular outflow obstruction or significant valvular regurgitation.  The fetal heart rate was within normal limits without ectopy or arrhythmia seen.  The spectral Doppler pattern across all valves, venous structures, and arterial structures was within normal limits.  There is no pericardial effusion.  Please see full report for " details.    In summary, Shannon Wright is a 33 y.o. [id number], currently 20w0d weeks gestation, who had a normal fetal echocardiogram at today's visit.  She has a history of positive anti-SSA antibodies. She has normal active thyroid.  She has no other children with complete heart block.  There is an increased risk for fetal/ lupus in the setting of hypothyroid.  There is a significant risk of fetal heart block in the setting of a previous child with heart block.  Though somewhat controversial, the American Heart Association recently recommended every other week screening fetal echocardiograms between 16-28 weeks.  Given the history, I recommend repeat fetal echocardiography every other week.  We did not prescribe any medications.  We did not recommend intervention.  As always, we recommend a heart healthy lifestyle.  She does not necessarily need to follow up with pediatric cardiology after the baby is born unless the pediatric team has any concerns or worries.     The exact prevalence of symptomatic or asymptomatic maternal autoantibody (anti-Ro/SSA or anti-La/SSB) positivity in the general population is unknown. In prospectively examined pregnancies of mothers with known antibodies and no prior affected child, the reported incidence of fetal CHB was between 1% and 5%. The number of affected pregnancies increases to 11% to 19% for those with a previously affected child with CHB.  In addition, women with both autoantibodies and hypothyroidism are at a 9-fold increased risk of having an affected fetus or  compared with those with SSA or SSB alone.   In addition to abnormalities in the conduction system, up to 10% to 15% of SSA-exposed fetuses with conduction system disease may also develop myocardial inflammation, endocardial fibroelastosis, or atrioventricular (AV) valve apparatus dysfunction.  Because of the perception that the inflammatory effects resulting from antibody exposure may be  preventable if detected and treated at an early stage, it has been recommended that SSA/SSB-positive women be referred for fetal echocardiography surveillance beginning in the early second trimester (16-18 weeks). The mechanical AR interval has been measured in fetuses at risk with the use of a variety of M-mode and pulsed Doppler techniques and compared with gestational age-adjusted normal values. Although the value of serial assessment for the detection of the progression of myocardial inflammation or conduction system disease from first-degree block (AR prolongation) to CHB has not been proved, serial assessment at 1- to 2-week intervals starting at 16 weeks and continuing through 28 weeks of gestation is reasonable to perform because the potential benefits outweigh the risks. For women who have had a previously affected child, more frequent serial assessment, at least weekly, is recommended. (Claire M, et al. Circulation. 2014;129:1-58)     LOC: 0  Normal fetal echocardiogram within the limitations of ultrasound. No changes were made to current delivery plan. Triage code 0:  Delivery per OB at patient's preferred hospital.  Standard  care per  team.  Cardiology consult not necessary, unless there are clinical concerns.       Thank you for allowing me to participate in Shannon's care.  If you have any further questions, please do not hesitate to contact me.     Ty Reyna M.D.  [facility], Director  Ambulatory Pediatric Cardiology   Division of Pediatric Cardiology  [facility]  [facility]   [facility] Pediatrics, [employer]  [facility] -   [address] [place] [address]  [place] [id number]  Office:  [phone]  Fax:       [phone]  e-mail:  [email or web address]    I spent greater than 45 minutes in performance of this  consultation, of which greater than 50% was related to coordination of care or counseling.

## 2024-04-23 ENCOUNTER — HOSPITAL ENCOUNTER (OUTPATIENT)
Dept: RADIOLOGY | Facility: CLINIC | Age: 34
Discharge: HOME | End: 2024-04-23
Payer: COMMERCIAL

## 2024-04-23 DIAGNOSIS — Z36.9 FIRST TRIMESTER SCREENING (HHS-HCC): ICD-10-CM

## 2024-04-23 PROCEDURE — 76816 OB US FOLLOW-UP PER FETUS: CPT | Performed by: OBSTETRICS & GYNECOLOGY

## 2024-04-23 PROCEDURE — 76816 OB US FOLLOW-UP PER FETUS: CPT

## 2024-04-25 ENCOUNTER — ANCILLARY PROCEDURE (OUTPATIENT)
Dept: PEDIATRIC CARDIOLOGY | Facility: CLINIC | Age: 34
End: 2024-04-25
Payer: COMMERCIAL

## 2024-04-25 ENCOUNTER — OFFICE VISIT (OUTPATIENT)
Dept: PEDIATRIC CARDIOLOGY | Facility: CLINIC | Age: 34
End: 2024-04-25
Payer: COMMERCIAL

## 2024-04-25 VITALS
HEART RATE: 90 BPM | SYSTOLIC BLOOD PRESSURE: 109 MMHG | WEIGHT: 174.16 LBS | HEIGHT: 64 IN | DIASTOLIC BLOOD PRESSURE: 69 MMHG | BODY MASS INDEX: 29.73 KG/M2

## 2024-04-25 DIAGNOSIS — O35.8XX0 MATERNAL CARE FOR OTHER (SUSPECTED) FETAL ABNORMALITY AND DAMAGE, NOT APPLICABLE OR UNSPECIFIED (HHS-HCC): ICD-10-CM

## 2024-04-25 DIAGNOSIS — O35.BXX0 ABNORMAL FETAL ECHOCARDIOGRAPHY AFFECTING ANTEPARTUM CARE OF MOTHER, SINGLE OR UNSPECIFIED FETUS (HHS-HCC): ICD-10-CM

## 2024-04-25 DIAGNOSIS — R76.8 SS-A ANTIBODY POSITIVE: Primary | ICD-10-CM

## 2024-04-25 LAB — BODY SURFACE AREA: 1.89 M2

## 2024-04-25 PROCEDURE — 99215 OFFICE O/P EST HI 40 MIN: CPT | Performed by: PEDIATRICS

## 2024-04-25 PROCEDURE — 93325 DOPPLER ECHO COLOR FLOW MAPG: CPT | Performed by: PEDIATRICS

## 2024-04-25 PROCEDURE — 76827 ECHO EXAM OF FETAL HEART: CPT | Performed by: PEDIATRICS

## 2024-04-25 NOTE — PROGRESS NOTES
Shannon Wright was seen at the request of Alejo Ponce MD for a chief complaint of Sjogren syndrome and anti-SSA antibody positivity; a report with my findings is being sent via written or electronic means the referring physician with my recommendations for treatment.     I had the pleasure of seeing Shannon Wright in Pediatric Cardiology consultation at our [facility] location as part of our prenatal heart program for Sjogren syndrome and anti-SSA antibody positivity.  She is a 34 y.o. year-old  woman, currently 21w6d weeks gestation.  Patient's last menstrual period was 2023 (exact date). Estimated Date of Delivery: 24.  There have been no pregnancy complications.   She has not been hospitalized during this pregnancy.  She had a NIPT, which was normal.  She had a second trimester ultrasound which was normal.      Prior to the visit, I personally reviewed the cardiac portions of the obstetrical ultrasound performed on .  There is normal segmental anatomy with normal 4 chamber, outflow tract and 3 vessel views.  There is no evidence of septation defect, right or left ventricular outflow obstruction or significant valvular regurgitation.    Her previous obstetrical history is significant for 2 full term deliveries.  Her past medical history is significant for Sjogren syndrome anti-SSA antibody positivity.  She has no history of congenital heart disease, arrhythmia, cardiomyopathy, hypercholesterolemia, hypertension, diabetes, rheumatic heart disease, cancer, asthma, lupus, clotting disorder, depression, anxiety, alcohol abuse, phenylketonuria, or DiGeorge.  She has had 2 c-sections.  She takes Aspirin, vitamin D, B12, calcium, and folic acid.  She has No Known Allergies. She is currently taking prenatal vitamins.      Her family history is negative for early atherosclerosis, sudden cardiac death, long QT syndrome, cardiomyopathy, or metabolic disease.  Her father is 75yrs old and has an  "aortic aneurysm. She has a cousin that passed away from a PE and was found to have a clotting disorder. She has 2 maternal aunts with Sjogren syndrome and 1 maternal aunt with lupus. She has a cousin that has a 5 week baby that has a double aortic arch and recently had surgery at [facility].    She currently lives with her spouse and 2 children and is .  She works as a .  She does not smoke.  She denies illicit drug use or alcohol abuse.  She denies verbal, sexual, or physical abuse.     Delivery [facility]: [facility]  Father of the baby's name: same    /69 (BP Location: Right arm, Patient Position: Sitting)   Pulse 90   Ht 1.629 m (5' 4.13\")   Wt 79 kg (174 lb 2.6 oz)   LMP 11/24/2023 (Exact Date)   BMI 29.77 kg/m²     She was resting comfortably in the examination room and alert, active and in no respiratory distress. Skin was without rash.  HEENT: moist mucous membranes, no JVD, goiter. Breathing is not labored.  She was acyanotic.  There was no peripheral edema.   The abdomen was gravid, soft, nontender with normal bowel sounds.  The liver was not palpable.  The spleen tip was not palpable.  She had a normal gait and normal strength in all extremities.  Cranial nerves II - XII are intact.  She had no clubbing, cyanosis, or edema.    A two-dimensional and Doppler fetal echocardiogram was performed today and interpreted by me at 21w6d weeks gestation.  The fetal echocardiogram showed normal segmental anatomy with no structural abnormalities found.  There is normal cardiac function.  There is no evidence of septation defect, right or left ventricular outflow obstruction or significant valvular regurgitation.  The fetal heart rate was within normal limits without ectopy or arrhythmia seen.  The spectral Doppler pattern across all valves, venous structures, and arterial structures was within normal limits.  There is no pericardial effusion.  Please see full report for details.    In summary, " Shannon Wright is a 34 y.o. [id number], currently 21w6d weeks gestation, who had a normal fetal echocardiogram at today's visit.  She has a history of positive anti-SSA antibodies. She has normal active thyroid.  She has no other children with complete heart block.  There is an increased risk for fetal/ lupus in the setting of hypothyroid.  There is a significant risk of fetal heart block in the setting of a previous child with heart block.  Though somewhat controversial, the American Heart Association recently recommended every other week screening fetal echocardiograms between 16-28 weeks.  Given the history, I recommend repeat fetal echocardiography every other week.  We did not prescribe any medications.  We did not recommend intervention.  As always, we recommend a heart healthy lifestyle.  She does not necessarily need to follow up with pediatric cardiology after the baby is born unless the pediatric team has any concerns or worries.     The exact prevalence of symptomatic or asymptomatic maternal autoantibody (anti-Ro/SSA or anti-La/SSB) positivity in the general population is unknown. In prospectively examined pregnancies of mothers with known antibodies and no prior affected child, the reported incidence of fetal CHB was between 1% and 5%. The number of affected pregnancies increases to 11% to 19% for those with a previously affected child with CHB.  In addition, women with both autoantibodies and hypothyroidism are at a 9-fold increased risk of having an affected fetus or  compared with those with SSA or SSB alone.   In addition to abnormalities in the conduction system, up to 10% to 15% of SSA-exposed fetuses with conduction system disease may also develop myocardial inflammation, endocardial fibroelastosis, or atrioventricular (AV) valve apparatus dysfunction.  Because of the perception that the inflammatory effects resulting from antibody exposure may be preventable if detected and  treated at an early stage, it has been recommended that SSA/SSB-positive women be referred for fetal echocardiography surveillance beginning in the early second trimester (16-18 weeks). The mechanical OR interval has been measured in fetuses at risk with the use of a variety of M-mode and pulsed Doppler techniques and compared with gestational age-adjusted normal values. Although the value of serial assessment for the detection of the progression of myocardial inflammation or conduction system disease from first-degree block (OR prolongation) to CHB has not been proved, serial assessment at 1- to 2-week intervals starting at 16 weeks and continuing through 28 weeks of gestation is reasonable to perform because the potential benefits outweigh the risks. For women who have had a previously affected child, more frequent serial assessment, at least weekly, is recommended. (Claire M, et al. Circulation. 2014;129:1-58)     LOC: 0  Normal fetal echocardiogram within the limitations of ultrasound. No changes were made to current delivery plan. Triage code 0:  Delivery per OB at patient's preferred hospital.  Standard  care per  team.  Cardiology consult not necessary, unless there are clinical concerns.       Thank you for allowing me to participate in [facility] care.  If you have any further questions, please do not hesitate to contact me.     Ty Reyna M.D.  [facility], Director  Ambulatory Pediatric Cardiology   Division of Pediatric Cardiology  [facility]  [facility]   [facility] Pediatrics, [employer]  [facility] -   [address] [place] [address]  [place] [id number]  Office:  [phone]  Fax:       [phone]  e-mail:  [email or web address]    I spent greater than 45 minutes in performance of this consultation, of which  greater than 50% was related to coordination of care or counseling.

## 2024-04-25 NOTE — LETTER
2024     Alejo Ponce MD  [address]  [facility] [address]    Patient: Shannon Wright   YOB: 1990   Date of Visit: 2024       Dear Dr. Alejo Ponce MD:    Thank you for referring Shannon Wright to me for evaluation. Below are my notes for this consultation.  If you have questions, please do not hesitate to call me. I look forward to following your patient along with you.       Sincerely,     Ty Reyna MD      CC: sIabelle Franks, APRN-CNM, DNP  Amber Jones, APRN-CNP  Yulisa Sheth MD  ______________________________________________________________________________________    Shannon Wright was seen at the request of Alejo Ponce MD for a chief complaint of Sjogren syndrome and anti-SSA antibody positivity; a report with my findings is being sent via written or electronic means the referring physician with my recommendations for treatment.     I had the pleasure of seeing Shannon Wright in Pediatric Cardiology consultation at our [facility] location as part of our prenatal heart program for Sjogren syndrome and anti-SSA antibody positivity.  She is a 34 y.o. year-old  woman, currently 21w6d weeks gestation.  Patient's last menstrual period was 2023 (exact date). Estimated Date of Delivery: 24.  There have been no pregnancy complications.   She has not been hospitalized during this pregnancy.  She had a NIPT, which was normal.  She had a second trimester ultrasound which was normal.      Prior to the visit, I personally reviewed the cardiac portions of the obstetrical ultrasound performed on .  There is normal segmental anatomy with normal 4 chamber, outflow tract and 3 vessel views.  There is no evidence of septation defect, right or left ventricular outflow obstruction or significant valvular regurgitation.    Her previous obstetrical history is significant for 2 full term deliveries.  Her past medical history is significant  "for Sjogren syndrome anti-SSA antibody positivity.  She has no history of congenital heart disease, arrhythmia, cardiomyopathy, hypercholesterolemia, hypertension, diabetes, rheumatic heart disease, cancer, asthma, lupus, clotting disorder, depression, anxiety, alcohol abuse, phenylketonuria, or DiGeorge.  She has had 2 c-sections.  She takes Aspirin, vitamin D, B12, calcium, and folic acid.  She has No Known Allergies. She is currently taking prenatal vitamins.      Her family history is negative for early atherosclerosis, sudden cardiac death, long QT syndrome, cardiomyopathy, or metabolic disease.  Her father is 75yrs old and has an aortic aneurysm. She has a cousin that passed away from a PE and was found to have a clotting disorder. She has 2 maternal aunts with Sjogren syndrome and 1 maternal aunt with lupus. She has a cousin that has a 5 week baby that has a double aortic arch and recently had surgery at [facility].    She currently lives with her spouse and 2 children and is .  She works as a .  She does not smoke.  She denies illicit drug use or alcohol abuse.  She denies verbal, sexual, or physical abuse.     Delivery Hospital: [facility]  Father of the baby's name: same    /69 (BP Location: Right arm, Patient Position: Sitting)   Pulse 90   Ht 1.629 m (5' 4.13\")   Wt 79 kg (174 lb 2.6 oz)   LMP 11/24/2023 (Exact Date)   BMI 29.77 kg/m²     She was resting comfortably in the examination room and alert, active and in no respiratory distress. Skin was without rash.  HEENT: moist mucous membranes, no JVD, goiter. Breathing is not labored.  She was acyanotic.  There was no peripheral edema.   The abdomen was gravid, soft, nontender with normal bowel sounds.  The liver was not palpable.  The spleen tip was not palpable.  She had a normal gait and normal strength in all extremities.  Cranial nerves II - XII are intact.  She had no clubbing, cyanosis, or edema.    A two-dimensional and " Doppler fetal echocardiogram was performed today and interpreted by me at 21w6d weeks gestation.  The fetal echocardiogram showed normal segmental anatomy with no structural abnormalities found.  There is normal cardiac function.  There is no evidence of septation defect, right or left ventricular outflow obstruction or significant valvular regurgitation.  The fetal heart rate was within normal limits without ectopy or arrhythmia seen.  The spectral Doppler pattern across all valves, venous structures, and arterial structures was within normal limits.  There is no pericardial effusion.  Please see full report for details.    In summary, Shannon Wright is a 34 y.o. [id number], currently 21w6d weeks gestation, who had a normal fetal echocardiogram at today's visit.  She has a history of positive anti-SSA antibodies. She has normal active thyroid.  She has no other children with complete heart block.  There is an increased risk for fetal/ lupus in the setting of hypothyroid.  There is a significant risk of fetal heart block in the setting of a previous child with heart block.  Though somewhat controversial, the American Heart Association recently recommended every other week screening fetal echocardiograms between 16-28 weeks.  Given the history, I recommend repeat fetal echocardiography every other week.  We did not prescribe any medications.  We did not recommend intervention.  As always, we recommend a heart healthy lifestyle.  She does not necessarily need to follow up with pediatric cardiology after the baby is born unless the pediatric team has any concerns or worries.     The exact prevalence of symptomatic or asymptomatic maternal autoantibody (anti-Ro/SSA or anti-La/SSB) positivity in the general population is unknown. In prospectively examined pregnancies of mothers with known antibodies and no prior affected child, the reported incidence of fetal CHB was between 1% and 5%. The number of affected  pregnancies increases to 11% to 19% for those with a previously affected child with CHB.  In addition, women with both autoantibodies and hypothyroidism are at a 9-fold increased risk of having an affected fetus or  compared with those with SSA or SSB alone.   In addition to abnormalities in the conduction system, up to 10% to 15% of SSA-exposed fetuses with conduction system disease may also develop myocardial inflammation, endocardial fibroelastosis, or atrioventricular (AV) valve apparatus dysfunction.  Because of the perception that the inflammatory effects resulting from antibody exposure may be preventable if detected and treated at an early stage, it has been recommended that SSA/SSB-positive women be referred for fetal echocardiography surveillance beginning in the early second trimester (16-18 weeks). The mechanical AK interval has been measured in fetuses at risk with the use of a variety of M-mode and pulsed Doppler techniques and compared with gestational age-adjusted normal values. Although the value of serial assessment for the detection of the progression of myocardial inflammation or conduction system disease from first-degree block (AK prolongation) to CHB has not been proved, serial assessment at 1- to 2-week intervals starting at 16 weeks and continuing through 28 weeks of gestation is reasonable to perform because the potential benefits outweigh the risks. For women who have had a previously affected child, more frequent serial assessment, at least weekly, is recommended. (Claire M, et al. Circulation. 2014;129:1-58)     LOC: 0  Normal fetal echocardiogram within the limitations of ultrasound. No changes were made to current delivery plan. Triage code 0:  Delivery per OB at patient's preferred hospital.  Standard  care per  team.  Cardiology consult not necessary, unless there are clinical concerns.       Thank you for allowing me to participate in Shannon's care.  If you  have any further questions, please do not hesitate to contact me.     Ty Reyna M.D.  [facility], Director  Ambulatory Pediatric Cardiology   Division of Pediatric Cardiology  [facility]  [facility]   of Pediatrics, [employer]  [facility] [address] [place] [address]  [place] [id number]  Office:  [phone]  Fax:       [phone]  e-mail:  [email or web address]    I spent greater than 45 minutes in performance of this consultation, of which greater than 50% was related to coordination of care or counseling.

## 2024-05-02 ENCOUNTER — ROUTINE PRENATAL (OUTPATIENT)
Dept: OBSTETRICS AND GYNECOLOGY | Facility: CLINIC | Age: 34
End: 2024-05-02
Payer: COMMERCIAL

## 2024-05-02 VITALS — SYSTOLIC BLOOD PRESSURE: 114 MMHG | DIASTOLIC BLOOD PRESSURE: 74 MMHG | WEIGHT: 175.8 LBS | BODY MASS INDEX: 30.05 KG/M2

## 2024-05-02 DIAGNOSIS — Z34.80 SUPERVISION OF OTHER NORMAL PREGNANCY, ANTEPARTUM (HHS-HCC): Primary | ICD-10-CM

## 2024-05-02 LAB
POC APPEARANCE, URINE: CLEAR
POC BLOOD, URINE: NEGATIVE
POC COLOR, URINE: NORMAL
POC GLUCOSE, URINE: NEGATIVE MG/DL
POC KETONES, URINE: NEGATIVE MG/DL
POC LEUKOCYTES, URINE: NEGATIVE
POC NITRITE,URINE: NEGATIVE
POC PROTEIN, URINE: NEGATIVE MG/DL

## 2024-05-02 PROCEDURE — 0501F PRENATAL FLOW SHEET: CPT | Performed by: OBSTETRICS & GYNECOLOGY

## 2024-05-02 NOTE — PROGRESS NOTES
Prenatal Visit    Patient presents for routine prenatal visit.   Feeling well.   Baby is moving. No abdominal pain. No bleeding. No leaking fluid.    Objective   Physical Exam:   Weight: 79.7 kg (175 lb 12.8 oz)  Expected Total Weight Gain: 7 kg (15 lb)-11.5 kg (25 lb)   Pregravid BMI: 28.89  BP: 114/74  Fetal Heart Rate: 145 Fundal Height (cm): 23 cm             Assessment/Plan     1. Supervision of other normal pregnancy, antepartum ([facility])  - CBC Anemia Panel With Reflex,Pregnancy; Future  - Glucose, 1 Hour Screen, Pregnancy; Future  - POCT UA Automated manually resulted    Doing well.  Glucose screening orders placed.  Will continue fetal echos every 2 weeks.  Continue prenatal vitamins  Precautions given. Advised her to call for any concerns.  Follow up in 4 weeks.

## 2024-05-09 ENCOUNTER — ANCILLARY PROCEDURE (OUTPATIENT)
Dept: PEDIATRIC CARDIOLOGY | Facility: CLINIC | Age: 34
End: 2024-05-09
Payer: COMMERCIAL

## 2024-05-09 ENCOUNTER — OFFICE VISIT (OUTPATIENT)
Dept: PEDIATRIC CARDIOLOGY | Facility: CLINIC | Age: 34
End: 2024-05-09
Payer: COMMERCIAL

## 2024-05-09 VITALS
SYSTOLIC BLOOD PRESSURE: 106 MMHG | DIASTOLIC BLOOD PRESSURE: 71 MMHG | HEIGHT: 63 IN | WEIGHT: 177.69 LBS | BODY MASS INDEX: 31.48 KG/M2 | HEART RATE: 81 BPM

## 2024-05-09 DIAGNOSIS — R76.8 SS-A ANTIBODY POSITIVE: Primary | ICD-10-CM

## 2024-05-09 DIAGNOSIS — O35.BXX0 ABNORMAL FETAL ECHOCARDIOGRAPHY AFFECTING ANTEPARTUM CARE OF MOTHER, SINGLE OR UNSPECIFIED FETUS (HHS-HCC): ICD-10-CM

## 2024-05-09 DIAGNOSIS — O35.8XX0 MATERNAL CARE FOR OTHER (SUSPECTED) FETAL ABNORMALITY AND DAMAGE, NOT APPLICABLE OR UNSPECIFIED (HHS-HCC): ICD-10-CM

## 2024-05-09 PROCEDURE — 76827 ECHO EXAM OF FETAL HEART: CPT | Performed by: PEDIATRICS

## 2024-05-09 PROCEDURE — 93325 DOPPLER ECHO COLOR FLOW MAPG: CPT | Performed by: PEDIATRICS

## 2024-05-09 PROCEDURE — 99214 OFFICE O/P EST MOD 30 MIN: CPT | Performed by: PEDIATRICS

## 2024-05-09 NOTE — PROGRESS NOTES
Shannon Wright was seen at the request of Alejo Ponce MD for a chief complaint of Sjogren syndrome and anti-SSA antibody positivity; a report with my findings is being sent via written or electronic means the referring physician with my recommendations for treatment.     I had the pleasure of seeing Shannon Wright in Pediatric Cardiology consultation at our [facility] location as part of our prenatal heart program for serial fetal echocardiograms for maternal history of Sjogren syndrome and anti-SSA antibody positivity.  She is a 34 y.o. year-old  woman, currently 23w6d weeks gestation.  Patient's last menstrual period was 2023 (exact date). Estimated Date of Delivery: 24.  There have been no pregnancy complications.   She has not been hospitalized during this pregnancy.  She had a NIPT, which was normal.  She had a second trimester ultrasound which was normal.      Prior to the visit, I personally reviewed the cardiac portions of the obstetrical ultrasound performed on .  There is normal segmental anatomy with normal 4 chamber, outflow tract and 3 vessel views.  There is no evidence of septation defect, right or left ventricular outflow obstruction or significant valvular regurgitation.    Her previous obstetrical history is significant for 2 full term deliveries.  Her past medical history is significant for Sjogren syndrome anti-SSA antibody positivity.  She has no history of congenital heart disease, arrhythmia, cardiomyopathy, hypercholesterolemia, hypertension, diabetes, rheumatic heart disease, cancer, asthma, lupus, clotting disorder, depression, anxiety, alcohol abuse, phenylketonuria, or DiGeorge.  She has had 2 c-sections.  She takes Aspirin, vitamin D, B12, calcium, and folic acid.  She has No Known Allergies. She is currently taking prenatal vitamins.      Her family history is negative for early atherosclerosis, sudden cardiac death, long QT syndrome, cardiomyopathy, or  "metabolic disease.  Her father is 75yrs old and has an aortic aneurysm. She has a cousin that passed away from a PE and was found to have a clotting disorder. She has 2 maternal aunts with Sjogren syndrome and 1 maternal aunt with lupus. She has a cousin that has a 5 week baby that has a double aortic arch and recently had surgery at [facility].    She currently lives with her spouse and 2 children and is .  She works as a .  She does not smoke.  She denies illicit drug use or alcohol abuse.  She denies verbal, sexual, or physical abuse.     Delivery Hospital: [facility]  Father of the baby's name: same    /71 (BP Location: Right arm, Patient Position: Sitting)   Pulse 81   Ht 1.601 m (5' 3.03\")   Wt 80.6 kg (177 lb 11.1 oz)   LMP 11/24/2023 (Exact Date)   BMI 31.45 kg/m²     She was resting comfortably in the examination room and alert, active and in no respiratory distress. Skin was without rash.  HEENT: moist mucous membranes, no JVD, goiter. Breathing is not labored.  She was acyanotic.  There was no peripheral edema.   The abdomen was gravid, soft, nontender with normal bowel sounds.  The liver was not palpable.  The spleen tip was not palpable.  She had a normal gait and normal strength in all extremities.  Cranial nerves II - XII are intact.  She had no clubbing, cyanosis, or edema.    A two-dimensional and Doppler fetal echocardiogram was performed today and interpreted by me at 23w6d weeks gestation.  The fetal echocardiogram showed normal segmental anatomy with no structural abnormalities found.  There is normal cardiac function.  There is no evidence of septation defect, right or left ventricular outflow obstruction or significant valvular regurgitation.  The fetal heart rate was within normal limits without ectopy or arrhythmia seen.  The spectral Doppler pattern across all valves, venous structures, and arterial structures was within normal limits.  There is no pericardial " effusion.  Please see full report for details.    In summary, Shannon Wright is a 34 y.o. [id number], currently 23w6d weeks gestation, who had a normal fetal echocardiogram at today's visit.  She has a history of positive anti-SSA antibodies. She has normal active thyroid.  She has no other children with complete heart block.  There is an increased risk for fetal/ lupus in the setting of hypothyroid.  There is a significant risk of fetal heart block in the setting of a previous child with heart block.  Though somewhat controversial, the American Heart Association recently recommended every other week screening fetal echocardiograms between 16-28 weeks.  Given the history, I recommend repeat fetal echocardiography every other week.  We did not prescribe any medications.  We did not recommend intervention.  As always, we recommend a heart healthy lifestyle.  She does not necessarily need to follow up with pediatric cardiology after the baby is born unless the pediatric team has any concerns or worries.     The exact prevalence of symptomatic or asymptomatic maternal autoantibody (anti-Ro/SSA or anti-La/SSB) positivity in the general population is unknown. In prospectively examined pregnancies of mothers with known antibodies and no prior affected child, the reported incidence of fetal CHB was between 1% and 5%. The number of affected pregnancies increases to 11% to 19% for those with a previously affected child with CHB.  In addition, women with both autoantibodies and hypothyroidism are at a 9-fold increased risk of having an affected fetus or  compared with those with SSA or SSB alone.   In addition to abnormalities in the conduction system, up to 10% to 15% of SSA-exposed fetuses with conduction system disease may also develop myocardial inflammation, endocardial fibroelastosis, or atrioventricular (AV) valve apparatus dysfunction.  Because of the perception that the inflammatory effects  resulting from antibody exposure may be preventable if detected and treated at an early stage, it has been recommended that SSA/SSB-positive women be referred for fetal echocardiography surveillance beginning in the early second trimester (16-18 weeks). The mechanical SC interval has been measured in fetuses at risk with the use of a variety of M-mode and pulsed Doppler techniques and compared with gestational age-adjusted normal values. Although the value of serial assessment for the detection of the progression of myocardial inflammation or conduction system disease from first-degree block (SC prolongation) to CHB has not been proved, serial assessment at 1- to 2-week intervals starting at 16 weeks and continuing through 28 weeks of gestation is reasonable to perform because the potential benefits outweigh the risks. For women who have had a previously affected child, more frequent serial assessment, at least weekly, is recommended. (Claire M, et al. Circulation. 2014;129:1-58)     LOC: 0  Normal fetal echocardiogram within the limitations of ultrasound. No changes were made to current delivery plan. Triage code 0:  Delivery per OB at patient's preferred hospital.  Standard  care per  team.  Cardiology consult not necessary, unless there are clinical concerns.       Thank you for allowing me to participate in Shannon's care.  If you have any further questions, please do not hesitate to contact me.     Ty Reyna M.D.  [facility], Director  Ambulatory Pediatric Cardiology   Division of Pediatric Cardiology  [facility]  [facility]   of Pediatrics, [facility] [employer] [facility]  [facility] -   [address] [place] [address]  [place] [id number]  Office:  [phone]  Fax:       [phone]  e-mail:  [email or web address]    I spent greater  than 30 minutes in performance of this consultation, of which greater than 50% was related to coordination of care or counseling.

## 2024-05-09 NOTE — LETTER
May 9, 2024     Alejo Ponce MD  [address]  [facility] [address]    Patient: Shannon Wright   YOB: 1990   Date of Visit: 2024       Dear Dr. Alejo Ponce MD:    Thank you for referring Shannon Wright to me for evaluation. Below are my notes for this consultation.  If you have questions, please do not hesitate to call me. I look forward to following your patient along with you.       Sincerely,     Ty Reyna MD      CC: Isabelle Franks, APRN-CNM, DNP  Amber Jones, APRN-CNP  Yulisa Sheth MD  ______________________________________________________________________________________    Shannon Wright was seen at the request of Alejo Ponce MD for a chief complaint of Sjogren syndrome and anti-SSA antibody positivity; a report with my findings is being sent via written or electronic means the referring physician with my recommendations for treatment.     I had the pleasure of seeing Shannon Wright in Pediatric Cardiology consultation at our [facility] location as part of our prenatal heart program for serial fetal echocardiograms for maternal history of Sjogren syndrome and anti-SSA antibody positivity.  She is a 34 y.o. year-old  woman, currently 23w6d weeks gestation.  Patient's last menstrual period was 2023 (exact date). Estimated Date of Delivery: 24.  There have been no pregnancy complications.   She has not been hospitalized during this pregnancy.  She had a NIPT, which was normal.  She had a second trimester ultrasound which was normal.      Prior to the visit, I personally reviewed the cardiac portions of the obstetrical ultrasound performed on .  There is normal segmental anatomy with normal 4 chamber, outflow tract and 3 vessel views.  There is no evidence of septation defect, right or left ventricular outflow obstruction or significant valvular regurgitation.    Her previous obstetrical history is significant for 2 full term  "deliveries.  Her past medical history is significant for Sjogren syndrome anti-SSA antibody positivity.  She has no history of congenital heart disease, arrhythmia, cardiomyopathy, hypercholesterolemia, hypertension, diabetes, rheumatic heart disease, cancer, asthma, lupus, clotting disorder, depression, anxiety, alcohol abuse, phenylketonuria, or DiGeorge.  She has had 2 c-sections.  She takes Aspirin, vitamin D, B12, calcium, and folic acid.  She has No Known Allergies. She is currently taking prenatal vitamins.      Her family history is negative for early atherosclerosis, sudden cardiac death, long QT syndrome, cardiomyopathy, or metabolic disease.  Her father is 75yrs old and has an aortic aneurysm. She has a cousin that passed away from a PE and was found to have a clotting disorder. She has 2 maternal aunts with Sjogren syndrome and 1 maternal aunt with lupus. She has a cousin that has a 5 week baby that has a double aortic arch and recently had surgery at [facility].    She currently lives with her spouse and 2 children and is .  She works as a .  She does not smoke.  She denies illicit drug use or alcohol abuse.  She denies verbal, sexual, or physical abuse.     Delivery Hospital: [facility]  Father of the baby's name: same    /71 (BP Location: Right arm, Patient Position: Sitting)   Pulse 81   Ht 1.601 m (5' 3.03\")   Wt 80.6 kg (177 lb 11.1 oz)   LMP 11/24/2023 (Exact Date)   BMI 31.45 kg/m²     She was resting comfortably in the examination room and alert, active and in no respiratory distress. Skin was without rash.  HEENT: moist mucous membranes, no JVD, goiter. Breathing is not labored.  She was acyanotic.  There was no peripheral edema.   The abdomen was gravid, soft, nontender with normal bowel sounds.  The liver was not palpable.  The spleen tip was not palpable.  She had a normal gait and normal strength in all extremities.  Cranial nerves II - XII are intact.  She had no " clubbing, cyanosis, or edema.    A two-dimensional and Doppler fetal echocardiogram was performed today and interpreted by me at 23w6d weeks gestation.  The fetal echocardiogram showed normal segmental anatomy with no structural abnormalities found.  There is normal cardiac function.  There is no evidence of septation defect, right or left ventricular outflow obstruction or significant valvular regurgitation.  The fetal heart rate was within normal limits without ectopy or arrhythmia seen.  The spectral Doppler pattern across all valves, venous structures, and arterial structures was within normal limits.  There is no pericardial effusion.  Please see full report for details.    In summary, Shannon Wright is a 34 y.o. [id number], currently 23w6d weeks gestation, who had a normal fetal echocardiogram at today's visit.  She has a history of positive anti-SSA antibodies. She has normal active thyroid.  She has no other children with complete heart block.  There is an increased risk for fetal/ lupus in the setting of hypothyroid.  There is a significant risk of fetal heart block in the setting of a previous child with heart block.  Though somewhat controversial, the American Heart Association recently recommended every other week screening fetal echocardiograms between 16-28 weeks.  Given the history, I recommend repeat fetal echocardiography every other week.  We did not prescribe any medications.  We did not recommend intervention.  As always, we recommend a heart healthy lifestyle.  She does not necessarily need to follow up with pediatric cardiology after the baby is born unless the pediatric team has any concerns or worries.     The exact prevalence of symptomatic or asymptomatic maternal autoantibody (anti-Ro/SSA or anti-La/SSB) positivity in the general population is unknown. In prospectively examined pregnancies of mothers with known antibodies and no prior affected child, the reported incidence of  fetal CHB was between 1% and 5%. The number of affected pregnancies increases to 11% to 19% for those with a previously affected child with CHB.  In addition, women with both autoantibodies and hypothyroidism are at a 9-fold increased risk of having an affected fetus or  compared with those with SSA or SSB alone.   In addition to abnormalities in the conduction system, up to 10% to 15% of SSA-exposed fetuses with conduction system disease may also develop myocardial inflammation, endocardial fibroelastosis, or atrioventricular (AV) valve apparatus dysfunction.  Because of the perception that the inflammatory effects resulting from antibody exposure may be preventable if detected and treated at an early stage, it has been recommended that SSA/SSB-positive women be referred for fetal echocardiography surveillance beginning in the early second trimester (16-18 weeks). The mechanical NE interval has been measured in fetuses at risk with the use of a variety of M-mode and pulsed Doppler techniques and compared with gestational age-adjusted normal values. Although the value of serial assessment for the detection of the progression of myocardial inflammation or conduction system disease from first-degree block (NE prolongation) to CHB has not been proved, serial assessment at 1- to 2-week intervals starting at 16 weeks and continuing through 28 weeks of gestation is reasonable to perform because the potential benefits outweigh the risks. For women who have had a previously affected child, more frequent serial assessment, at least weekly, is recommended. (Claire M, et al. Circulation. 2014;129:1-58)     LOC: 0  Normal fetal echocardiogram within the limitations of ultrasound. No changes were made to current delivery plan. Triage code 0:  Delivery per OB at patient's preferred hospital.  Standard  care per  team.  Cardiology consult not necessary, unless there are clinical concerns.       Thank you  for allowing me to participate in Shannon's care.  If you have any further questions, please do not hesitate to contact me.     Ty Reyna M.D.  [facility], Director  Ambulatory Pediatric Cardiology   Division of Pediatric Cardiology  [facility]  [facility]   of Pediatrics, [employer]  [facility] -   [address] [place] [address]  [place] [id number]  Office:  [phone]  Fax:       [phone]  e-mail:  [email or web address]    I spent greater than 30 minutes in performance of this consultation, of which greater than 50% was related to coordination of care or counseling.

## 2024-05-21 ENCOUNTER — LAB (OUTPATIENT)
Dept: LAB | Facility: LAB | Age: 34
End: 2024-05-21
Payer: COMMERCIAL

## 2024-05-21 DIAGNOSIS — Z34.80 SUPERVISION OF OTHER NORMAL PREGNANCY, ANTEPARTUM (HHS-HCC): ICD-10-CM

## 2024-05-21 LAB
ERYTHROCYTE [DISTWIDTH] IN BLOOD BY AUTOMATED COUNT: 14.8 % (ref 11.5–14.5)
GLUCOSE 1H P 50 G GLC PO SERPL-MCNC: 120 MG/DL
HCT VFR BLD AUTO: 38.2 % (ref 36–46)
HGB BLD-MCNC: 12.5 G/DL (ref 12–16)
MCH RBC QN AUTO: 30.1 PG (ref 26–34)
MCHC RBC AUTO-ENTMCNC: 32.7 G/DL (ref 32–36)
MCV RBC AUTO: 92 FL (ref 80–100)
NRBC BLD-RTO: 0 /100 WBCS (ref 0–0)
PLATELET # BLD AUTO: 210 X10*3/UL (ref 150–450)
RBC # BLD AUTO: 4.15 X10*6/UL (ref 4–5.2)
REFLEX ADDED, ANEMIA PANEL: NORMAL
WBC # BLD AUTO: 9.3 X10*3/UL (ref 4.4–11.3)

## 2024-05-21 PROCEDURE — 82947 ASSAY GLUCOSE BLOOD QUANT: CPT

## 2024-05-21 PROCEDURE — 36415 COLL VENOUS BLD VENIPUNCTURE: CPT

## 2024-05-21 PROCEDURE — 85027 COMPLETE CBC AUTOMATED: CPT

## 2024-05-24 ENCOUNTER — HOSPITAL ENCOUNTER (OUTPATIENT)
Dept: PEDIATRIC CARDIOLOGY | Facility: HOSPITAL | Age: 34
Discharge: HOME | End: 2024-05-24
Payer: COMMERCIAL

## 2024-05-24 ENCOUNTER — ROUTINE PRENATAL (OUTPATIENT)
Dept: OBSTETRICS AND GYNECOLOGY | Facility: CLINIC | Age: 34
End: 2024-05-24
Payer: COMMERCIAL

## 2024-05-24 ENCOUNTER — OFFICE VISIT (OUTPATIENT)
Dept: PEDIATRIC CARDIOLOGY | Facility: HOSPITAL | Age: 34
End: 2024-05-24
Payer: COMMERCIAL

## 2024-05-24 VITALS — WEIGHT: 178.8 LBS | SYSTOLIC BLOOD PRESSURE: 124 MMHG | DIASTOLIC BLOOD PRESSURE: 74 MMHG | BODY MASS INDEX: 30.6 KG/M2

## 2024-05-24 VITALS
BODY MASS INDEX: 31.65 KG/M2 | DIASTOLIC BLOOD PRESSURE: 66 MMHG | SYSTOLIC BLOOD PRESSURE: 106 MMHG | HEIGHT: 64 IN | WEIGHT: 185.41 LBS | HEART RATE: 81 BPM

## 2024-05-24 DIAGNOSIS — O35.BXX0 ABNORMAL FETAL ECHOCARDIOGRAPHY AFFECTING ANTEPARTUM CARE OF MOTHER, SINGLE OR UNSPECIFIED FETUS (HHS-HCC): ICD-10-CM

## 2024-05-24 DIAGNOSIS — M35.00 SJOGREN'S SYNDROME, WITH UNSPECIFIED ORGAN INVOLVEMENT (MULTI): ICD-10-CM

## 2024-05-24 DIAGNOSIS — R76.8 SS-A ANTIBODY POSITIVE: Primary | ICD-10-CM

## 2024-05-24 DIAGNOSIS — O35.8XX0 MATERNAL CARE FOR OTHER (SUSPECTED) FETAL ABNORMALITY AND DAMAGE, NOT APPLICABLE OR UNSPECIFIED (HHS-HCC): ICD-10-CM

## 2024-05-24 DIAGNOSIS — Z34.80 SUPERVISION OF OTHER NORMAL PREGNANCY, ANTEPARTUM (HHS-HCC): Primary | ICD-10-CM

## 2024-05-24 DIAGNOSIS — O09.522 MULTIGRAVIDA OF ADVANCED MATERNAL AGE IN SECOND TRIMESTER (HHS-HCC): ICD-10-CM

## 2024-05-24 PROBLEM — O34.219 HISTORY OF CESAREAN DELIVERY, ANTEPARTUM (HHS-HCC): Status: ACTIVE | Noted: 2024-05-24

## 2024-05-24 LAB
POC BLOOD, URINE: NEGATIVE
POC GLUCOSE, URINE: NEGATIVE MG/DL
POC KETONES, URINE: NEGATIVE MG/DL
POC LEUKOCYTES, URINE: NEGATIVE
POC NITRITE,URINE: NEGATIVE
POC PROTEIN, URINE: NEGATIVE MG/DL

## 2024-05-24 PROCEDURE — 76825 ECHO EXAM OF FETAL HEART: CPT

## 2024-05-24 PROCEDURE — 76825 ECHO EXAM OF FETAL HEART: CPT | Performed by: PEDIATRICS

## 2024-05-24 PROCEDURE — 99214 OFFICE O/P EST MOD 30 MIN: CPT | Performed by: PEDIATRICS

## 2024-05-24 PROCEDURE — 93325 DOPPLER ECHO COLOR FLOW MAPG: CPT | Performed by: PEDIATRICS

## 2024-05-24 PROCEDURE — 0501F PRENATAL FLOW SHEET: CPT | Performed by: OBSTETRICS & GYNECOLOGY

## 2024-05-24 PROCEDURE — 76827 ECHO EXAM OF FETAL HEART: CPT | Performed by: PEDIATRICS

## 2024-05-24 NOTE — PROGRESS NOTES
Shannon Wright was seen at the request of Alejo Ponce MD for a chief complaint of Sjogren syndrome and anti-SSA antibody positivity; a report with my findings is being sent via written or electronic means the referring physician with my recommendations for treatment.     I had the pleasure of seeing Shannon Wright in Pediatric Cardiology consultation at our [facility] location as part of our prenatal heart program for serial fetal echocardiograms for maternal history of Sjogren syndrome and anti-SSA antibody positivity.  She is a 34 y.o. year-old  woman, currently 26w0d weeks gestation.  Patient's last menstrual period was 2023 (exact date). Estimated Date of Delivery: 24.  There have been no pregnancy complications.   She has not been hospitalized during this pregnancy.  She had a NIPT, which was normal.  She had a second trimester ultrasound which was normal.      Prior to the visit, I personally reviewed the cardiac portions of the obstetrical ultrasound performed on .  There is normal segmental anatomy with normal 4 chamber, outflow tract and 3 vessel views.  There is no evidence of septation defect, right or left ventricular outflow obstruction or significant valvular regurgitation.    Her previous obstetrical history is significant for 2 full term deliveries.  Her past medical history is significant for Sjogren syndrome anti-SSA antibody positivity.  She has no history of congenital heart disease, arrhythmia, cardiomyopathy, hypercholesterolemia, hypertension, diabetes, rheumatic heart disease, cancer, asthma, lupus, clotting disorder, depression, anxiety, alcohol abuse, phenylketonuria, or DiGeorge.  She has had 2 c-sections.  She takes Aspirin, vitamin D, B12, calcium, and folic acid.  She has No Known Allergies. She is currently taking prenatal vitamins.      Her family history is negative for early atherosclerosis, sudden cardiac death, long QT syndrome, cardiomyopathy, or  "metabolic disease.  Her father is 75yrs old and has an aortic aneurysm. She has a cousin that passed away from a PE and was found to have a clotting disorder. She has 2 maternal aunts with Sjogren syndrome and 1 maternal aunt with lupus. She has a cousin that has a 5 week baby that has a double aortic arch and recently had surgery at [facility].    She currently lives with her spouse and 2 children and is .  She works as a .  She does not smoke.  She denies illicit drug use or alcohol abuse.  She denies verbal, sexual, or physical abuse.     Delivery Hospital: [facility]  Father of the baby's name: same    /66 (BP Location: Right arm, Patient Position: Sitting, BP Cuff Size: Adult)   Pulse 81   Ht 1.628 m (5' 4.09\")   Wt 84.1 kg (185 lb 6.5 oz)   LMP 11/24/2023 (Exact Date)   BMI 31.73 kg/m²     She was resting comfortably in the examination room and alert, active and in no respiratory distress. Skin was without rash.  HEENT: moist mucous membranes, no JVD, goiter. Breathing is not labored.  She was acyanotic.  There was no peripheral edema.   The abdomen was gravid, soft, nontender with normal bowel sounds.  The liver was not palpable.  The spleen tip was not palpable.  She had a normal gait and normal strength in all extremities.  Cranial nerves II - XII are intact.  She had no clubbing, cyanosis, or edema.    A two-dimensional and Doppler fetal echocardiogram was performed today and interpreted by me at 26w0d weeks gestation.  The fetal echocardiogram showed normal segmental anatomy with no structural abnormalities found.  There is normal cardiac function.  There is no evidence of septation defect, right or left ventricular outflow obstruction or significant valvular regurgitation.  The fetal heart rate was within normal limits without ectopy or arrhythmia seen.  The spectral Doppler pattern across all valves, venous structures, and arterial structures was within normal limits.  There " is no pericardial effusion.  Please see full report for details.    In summary, Shannon Wright is a 34 y.o. [id number], currently 23w6d weeks gestation, who had a normal fetal echocardiogram at today's visit.  She has a history of positive anti-SSA antibodies. She has normal active thyroid.  She has no other children with complete heart block.  There is an increased risk for fetal/ lupus in the setting of hypothyroid.  There is a significant risk of fetal heart block in the setting of a previous child with heart block.  Though somewhat controversial, the American Heart Association recently recommended every other week screening fetal echocardiograms between 16-28 weeks.  Given the history, I recommend repeat fetal echocardiography every other week.  We did not prescribe any medications.  We did not recommend intervention.  As always, we recommend a heart healthy lifestyle.  She does not necessarily need to follow up with pediatric cardiology after the baby is born unless the pediatric team has any concerns or worries.     The exact prevalence of symptomatic or asymptomatic maternal autoantibody (anti-Ro/SSA or anti-La/SSB) positivity in the general population is unknown. In prospectively examined pregnancies of mothers with known antibodies and no prior affected child, the reported incidence of fetal CHB was between 1% and 5%. The number of affected pregnancies increases to 11% to 19% for those with a previously affected child with CHB.  In addition, women with both autoantibodies and hypothyroidism are at a 9-fold increased risk of having an affected fetus or  compared with those with SSA or SSB alone.   In addition to abnormalities in the conduction system, up to 10% to 15% of SSA-exposed fetuses with conduction system disease may also develop myocardial inflammation, endocardial fibroelastosis, or atrioventricular (AV) valve apparatus dysfunction.  Because of the perception that the  inflammatory effects resulting from antibody exposure may be preventable if detected and treated at an early stage, it has been recommended that SSA/SSB-positive women be referred for fetal echocardiography surveillance beginning in the early second trimester (16-18 weeks). The mechanical AK interval has been measured in fetuses at risk with the use of a variety of M-mode and pulsed Doppler techniques and compared with gestational age-adjusted normal values. Although the value of serial assessment for the detection of the progression of myocardial inflammation or conduction system disease from first-degree block (AK prolongation) to CHB has not been proved, serial assessment at 1- to 2-week intervals starting at 16 weeks and continuing through 28 weeks of gestation is reasonable to perform because the potential benefits outweigh the risks. For women who have had a previously affected child, more frequent serial assessment, at least weekly, is recommended. (Claire LEY, et al. Circulation. 2014;129:1-58)     LOC: 0  Normal fetal echocardiogram within the limitations of ultrasound. No changes were made to current delivery plan. Triage code 0:  Delivery per OB at patient's preferred hospital.  Standard  care per  team.  Cardiology consult not necessary, unless there are clinical concerns.       Thank you for allowing me to participate in [facility] care.  If you have any further questions, please do not hesitate to contact me.     Ty Reyna M.D.  [facility], Director  Ambulatory Pediatric Cardiology   Division of Pediatric Cardiology  [facility]  [facility]   of Pediatrics, [employer]  [facility] -   [address]  [place] [address]  Office:  [phone]  Fax:       [phone]  e-mail:   [email or web address]    I spent greater than 30 minutes in performance of this consultation, of which greater than 50% was related to coordination of care or counseling.

## 2024-05-24 NOTE — PROGRESS NOTES
Prenatal Visit    Patient presents for routine prenatal visit.   Feeling well.  Baby is moving. No abdominal pain. No bleeding. No leaking fluid.    Objective   Physical Exam:   Weight: 81.1 kg (178 lb 12.8 oz)  Expected Total Weight Gain: 7 kg (15 lb)-11.5 kg (25 lb)   Pregravid BMI: 28.92  BP: 124/74  Fetal Heart Rate: 145 Fundal Height (cm): 26 cm             Assessment/Plan   1. Supervision of other normal pregnancy, antepartum ([facility])  - POCT UA Automated manually resulted  2. Multigravida of advanced maternal age in second trimester ([facility])  3. Sjogren's syndrome, with unspecified organ involvement ([facility])    Doing well today.   Had fetal echo earlier today which was normal; follow up again in 2 weeks.  Continue prenatal vitamins  Precautions given. Advised her to call for any concerns.  Follow up in 4 weeks for routine ob visit.

## 2024-05-24 NOTE — LETTER
May 24, 2024     Alejo Ponce MD  [id number]  [facility] [address]    Patient: Shannon Wright   YOB: 1990   Date of Visit: 2024       Dear Dr. Alejo Ponce MD:    Thank you for referring Shannon Wright to me for evaluation. Below are my notes for this consultation.  If you have questions, please do not hesitate to call me. I look forward to following your patient along with you.       Sincerely,     Ty Reyna MD      CC: Isabelle Franks, APRN-CNM, DNP  Amber Jones, APRN-CNP  Yulisa Sheth MD  ______________________________________________________________________________________    Shannon Wright was seen at the request of Alejo Ponce MD for a chief complaint of Sjogren syndrome and anti-SSA antibody positivity; a report with my findings is being sent via written or electronic means the referring physician with my recommendations for treatment.     I had the pleasure of seeing Shannon Wright in Pediatric Cardiology consultation at our [facility] location as part of our prenatal heart program for serial fetal echocardiograms for maternal history of Sjogren syndrome and anti-SSA antibody positivity.  She is a 34 y.o. year-old  woman, currently 26w0d weeks gestation.  Patient's last menstrual period was 2023 (exact date). Estimated Date of Delivery: 24.  There have been no pregnancy complications.   She has not been hospitalized during this pregnancy.  She had a NIPT, which was normal.  She had a second trimester ultrasound which was normal.      Prior to the visit, I personally reviewed the cardiac portions of the obstetrical ultrasound performed on .  There is normal segmental anatomy with normal 4 chamber, outflow tract and 3 vessel views.  There is no evidence of septation defect, right or left ventricular outflow obstruction or significant valvular regurgitation.    Her previous obstetrical history is significant for 2 full term  "deliveries.  Her past medical history is significant for Sjogren syndrome anti-SSA antibody positivity.  She has no history of congenital heart disease, arrhythmia, cardiomyopathy, hypercholesterolemia, hypertension, diabetes, rheumatic heart disease, cancer, asthma, lupus, clotting disorder, depression, anxiety, alcohol abuse, phenylketonuria, or DiGeorge.  She has had 2 c-sections.  She takes Aspirin, vitamin D, B12, calcium, and folic acid.  She has No Known Allergies. She is currently taking prenatal vitamins.      Her family history is negative for early atherosclerosis, sudden cardiac death, long QT syndrome, cardiomyopathy, or metabolic disease.  Her father is 75yrs old and has an aortic aneurysm. She has a cousin that passed away from a PE and was found to have a clotting disorder. She has 2 maternal aunts with Sjogren syndrome and 1 maternal aunt with lupus. She has a cousin that has a 5 week baby that has a double aortic arch and recently had surgery at [facility].    She currently lives with her spouse and 2 children and is .  She works as a .  She does not smoke.  She denies illicit drug use or alcohol abuse.  She denies verbal, sexual, or physical abuse.     Delivery Hospital: [facility]  Father of the baby's name: same    /66 (BP Location: Right arm, Patient Position: Sitting, BP Cuff Size: Adult)   Pulse 81   Ht 1.628 m (5' 4.09\")   Wt 84.1 kg (185 lb 6.5 oz)   LMP 11/24/2023 (Exact Date)   BMI 31.73 kg/m²     She was resting comfortably in the examination room and alert, active and in no respiratory distress. Skin was without rash.  HEENT: moist mucous membranes, no JVD, goiter. Breathing is not labored.  She was acyanotic.  There was no peripheral edema.   The abdomen was gravid, soft, nontender with normal bowel sounds.  The liver was not palpable.  The spleen tip was not palpable.  She had a normal gait and normal strength in all extremities.  Cranial nerves II - XII are " intact.  She had no clubbing, cyanosis, or edema.    A two-dimensional and Doppler fetal echocardiogram was performed today and interpreted by me at 26w0d weeks gestation.  The fetal echocardiogram showed normal segmental anatomy with no structural abnormalities found.  There is normal cardiac function.  There is no evidence of septation defect, right or left ventricular outflow obstruction or significant valvular regurgitation.  The fetal heart rate was within normal limits without ectopy or arrhythmia seen.  The spectral Doppler pattern across all valves, venous structures, and arterial structures was within normal limits.  There is no pericardial effusion.  Please see full report for details.    In summary, Shannon Wright is a 34 y.o. [id number], currently 23w6d weeks gestation, who had a normal fetal echocardiogram at today's visit.  She has a history of positive anti-SSA antibodies. She has normal active thyroid.  She has no other children with complete heart block.  There is an increased risk for fetal/ lupus in the setting of hypothyroid.  There is a significant risk of fetal heart block in the setting of a previous child with heart block.  Though somewhat controversial, the American Heart Association recently recommended every other week screening fetal echocardiograms between 16-28 weeks.  Given the history, I recommend repeat fetal echocardiography every other week.  We did not prescribe any medications.  We did not recommend intervention.  As always, we recommend a heart healthy lifestyle.  She does not necessarily need to follow up with pediatric cardiology after the baby is born unless the pediatric team has any concerns or worries.     The exact prevalence of symptomatic or asymptomatic maternal autoantibody (anti-Ro/SSA or anti-La/SSB) positivity in the general population is unknown. In prospectively examined pregnancies of mothers with known antibodies and no prior affected child, the  reported incidence of fetal CHB was between 1% and 5%. The number of affected pregnancies increases to 11% to 19% for those with a previously affected child with CHB.  In addition, women with both autoantibodies and hypothyroidism are at a 9-fold increased risk of having an affected fetus or  compared with those with SSA or SSB alone.   In addition to abnormalities in the conduction system, up to 10% to 15% of SSA-exposed fetuses with conduction system disease may also develop myocardial inflammation, endocardial fibroelastosis, or atrioventricular (AV) valve apparatus dysfunction.  Because of the perception that the inflammatory effects resulting from antibody exposure may be preventable if detected and treated at an early stage, it has been recommended that SSA/SSB-positive women be referred for fetal echocardiography surveillance beginning in the early second trimester (16-18 weeks). The mechanical [place] interval has been measured in fetuses at risk with the use of a variety of M-mode and pulsed Doppler techniques and compared with gestational age-adjusted normal values. Although the value of serial assessment for the detection of the progression of myocardial inflammation or conduction system disease from first-degree block ([place] prolongation) to CHB has not been proved, serial assessment at 1- to 2-week intervals starting at 16 weeks and continuing through 28 weeks of gestation is reasonable to perform because the potential benefits outweigh the risks. For women who have had a previously affected child, more frequent serial assessment, at least weekly, is recommended. (Claire M, et al. Circulation. 2014;129:1-58)     LOC: 0  Normal fetal echocardiogram within the limitations of ultrasound. No changes were made to current delivery plan. Triage code 0:  Delivery per OB at patient's preferred hospital.  Standard  care per  team.  Cardiology consult not necessary, unless there are clinical  concerns.       Thank you for allowing me to participate in Shannon's care.  If you have any further questions, please do not hesitate to contact me.     Ty Reyna M.D.  [facility], Director  Ambulatory Pediatric Cardiology   Division of Pediatric Cardiology  [facility]  [facility]   of Pediatrics, [facility]  [facility] -   [address]  [place] [id number]  Office:  [phone]  Fax:       [phone]  e-mail:  [email or web address]    I spent greater than 30 minutes in performance of this consultation, of which greater than 50% was related to coordination of care or counseling.

## 2024-06-07 ENCOUNTER — OFFICE VISIT (OUTPATIENT)
Dept: PEDIATRIC CARDIOLOGY | Facility: HOSPITAL | Age: 34
End: 2024-06-07
Payer: COMMERCIAL

## 2024-06-07 ENCOUNTER — HOSPITAL ENCOUNTER (OUTPATIENT)
Dept: PEDIATRIC CARDIOLOGY | Facility: HOSPITAL | Age: 34
Discharge: HOME | End: 2024-06-07
Payer: COMMERCIAL

## 2024-06-07 VITALS
DIASTOLIC BLOOD PRESSURE: 68 MMHG | OXYGEN SATURATION: 97 % | HEIGHT: 64 IN | SYSTOLIC BLOOD PRESSURE: 104 MMHG | WEIGHT: 180.78 LBS | HEART RATE: 71 BPM | BODY MASS INDEX: 30.86 KG/M2

## 2024-06-07 VITALS
HEIGHT: 63 IN | HEART RATE: 71 BPM | OXYGEN SATURATION: 97 % | SYSTOLIC BLOOD PRESSURE: 104 MMHG | DIASTOLIC BLOOD PRESSURE: 68 MMHG | BODY MASS INDEX: 31.29 KG/M2

## 2024-06-07 DIAGNOSIS — O35.BXX0 ABNORMAL FETAL ECHOCARDIOGRAPHY AFFECTING ANTEPARTUM CARE OF MOTHER, SINGLE OR UNSPECIFIED FETUS (HHS-HCC): ICD-10-CM

## 2024-06-07 DIAGNOSIS — R76.8 SS-A ANTIBODY POSITIVE: Primary | ICD-10-CM

## 2024-06-07 DIAGNOSIS — O35.8XX0 MATERNAL CARE FOR OTHER (SUSPECTED) FETAL ABNORMALITY AND DAMAGE, NOT APPLICABLE OR UNSPECIFIED (HHS-HCC): ICD-10-CM

## 2024-06-07 PROCEDURE — 76827 ECHO EXAM OF FETAL HEART: CPT | Performed by: PEDIATRICS

## 2024-06-07 PROCEDURE — 99214 OFFICE O/P EST MOD 30 MIN: CPT | Performed by: PEDIATRICS

## 2024-06-07 PROCEDURE — 76825 ECHO EXAM OF FETAL HEART: CPT | Performed by: PEDIATRICS

## 2024-06-07 PROCEDURE — 93325 DOPPLER ECHO COLOR FLOW MAPG: CPT | Performed by: PEDIATRICS

## 2024-06-07 PROCEDURE — 76825 ECHO EXAM OF FETAL HEART: CPT

## 2024-06-07 NOTE — LETTER
2024     Alejo Ponce MD  [address]  [facility] [address]    Patient: Shannon Wright   YOB: 1990   Date of Visit: 2024       Dear Dr. Alejo Ponce MD:    Thank you for referring Shannon Wright to me for evaluation. Below are my notes for this consultation.  If you have questions, please do not hesitate to call me. I look forward to following your patient along with you.       Sincerely,     Ty Reyna MD      CC: Isabelle Franks, APRN-CNM, DNP  Amber Jones, APRN-CNP  Yulisa Sheth MD  ______________________________________________________________________________________    Shannon Wright was seen at the request of Alejo Ponce MD for a chief complaint of Sjogren syndrome and anti-SSA antibody positivity; a report with my findings is being sent via written or electronic means the referring physician with my recommendations for treatment.     I had the pleasure of seeing Shannon Wright in Pediatric Cardiology consultation at our [facility] location as part of our prenatal heart program for serial fetal echocardiograms for maternal history of Sjogren syndrome and anti-SSA antibody positivity.  She is a 34 y.o. year-old  woman, currently 28w0d weeks gestation.  Patient's last menstrual period was 2023 (exact date). Estimated Date of Delivery: 24.  There have been no pregnancy complications.   She has not been hospitalized during this pregnancy.  She had a NIPT, which was normal.  She had a second trimester ultrasound which was normal.      Prior to the visit, I personally reviewed the cardiac portions of the obstetrical ultrasound performed on .  There is normal segmental anatomy with normal 4 chamber, outflow tract and 3 vessel views.  There is no evidence of septation defect, right or left ventricular outflow obstruction or significant valvular regurgitation.    Her previous obstetrical history is significant for 2 full term  "deliveries.  Her past medical history is significant for Sjogren syndrome anti-SSA antibody positivity.  She has no history of congenital heart disease, arrhythmia, cardiomyopathy, hypercholesterolemia, hypertension, diabetes, rheumatic heart disease, cancer, asthma, lupus, clotting disorder, depression, anxiety, alcohol abuse, phenylketonuria, or DiGeorge.  She has had 2 c-sections.  She takes Aspirin, vitamin D, B12, calcium, and folic acid.  She has No Known Allergies. She is currently taking prenatal vitamins.      Her family history is negative for early atherosclerosis, sudden cardiac death, long QT syndrome, cardiomyopathy, or metabolic disease.  Her father is 75yrs old and has an aortic aneurysm. She has a cousin that passed away from a PE and was found to have a clotting disorder. She has 2 maternal aunts with Sjogren syndrome and 1 maternal aunt with lupus. She has a cousin that has a 5 week baby that has a double aortic arch and recently had surgery at [facility].    She currently lives with her spouse and 2 children and is .  She works as a .  She does not smoke.  She denies illicit drug use or alcohol abuse.  She denies verbal, sexual, or physical abuse.     Delivery Hospital: [facility]  Father of the baby's name: same    /68 (BP Location: Right arm, Patient Position: Sitting, BP Cuff Size: Adult)   Pulse 71   Ht 1.617 m (5' 3.66\")   Wt 82 kg (180 lb 12.4 oz)   LMP 11/24/2023 (Exact Date)   SpO2 97%   BMI 31.36 kg/m²     She was resting comfortably in the examination room and alert, active and in no respiratory distress. Skin was without rash.  HEENT: moist mucous membranes, no JVD, goiter. Breathing is not labored.  She was acyanotic.  There was no peripheral edema.   The abdomen was gravid, soft, nontender with normal bowel sounds.  The liver was not palpable.  The spleen tip was not palpable.  She had a normal gait and normal strength in all extremities.  Cranial nerves " II - XII are intact.  She had no clubbing, cyanosis, or edema.    A two-dimensional and Doppler fetal echocardiogram was performed today and interpreted by me at 28w0d weeks gestation.  The fetal echocardiogram showed normal segmental anatomy with no structural abnormalities found.  There is normal cardiac function.  There is no evidence of septation defect, right or left ventricular outflow obstruction or significant valvular regurgitation.  The fetal heart rate was within normal limits without ectopy or arrhythmia seen.  The spectral Doppler pattern across all valves, venous structures, and arterial structures was within normal limits.  There is no pericardial effusion.  Please see full report for details.    In summary, Shannon Wright is a 34 y.o. [id number], currently 28w0d weeks gestation, who had a normal fetal echocardiogram at today's visit.  She has a history of positive anti-SSA antibodies. She has normal active thyroid.  She has no other children with complete heart block.  There is an increased risk for fetal/ lupus in the setting of hypothyroid.  There is a significant risk of fetal heart block in the setting of a previous child with heart block.  We did not prescribe any medications.  We did not recommend intervention.  As always, we recommend a heart healthy lifestyle.  She does not necessarily need to follow up with pediatric cardiology after the baby is born unless the pediatric team has any concerns or worries.     The exact prevalence of symptomatic or asymptomatic maternal autoantibody (anti-Ro/SSA or anti-La/SSB) positivity in the general population is unknown. In prospectively examined pregnancies of mothers with known antibodies and no prior affected child, the reported incidence of fetal CHB was between 1% and 5%. The number of affected pregnancies increases to 11% to 19% for those with a previously affected child with CHB.  In addition, women with both autoantibodies and  hypothyroidism are at a 9-fold increased risk of having an affected fetus or  compared with those with SSA or SSB alone.   In addition to abnormalities in the conduction system, up to 10% to 15% of SSA-exposed fetuses with conduction system disease may also develop myocardial inflammation, endocardial fibroelastosis, or atrioventricular (AV) valve apparatus dysfunction.  Because of the perception that the inflammatory effects resulting from antibody exposure may be preventable if detected and treated at an early stage, it has been recommended that SSA/SSB-positive women be referred for fetal echocardiography surveillance beginning in the early second trimester (16-18 weeks). The mechanical [place] interval has been measured in fetuses at risk with the use of a variety of M-mode and pulsed Doppler techniques and compared with gestational age-adjusted normal values. Although the value of serial assessment for the detection of the progression of myocardial inflammation or conduction system disease from first-degree block ([place] prolongation) to CHB has not been proved, serial assessment at 1- to 2-week intervals starting at 16 weeks and continuing through 28 weeks of gestation is reasonable to perform because the potential benefits outweigh the risks. For women who have had a previously affected child, more frequent serial assessment, at least weekly, is recommended. (Claire LEY, et al. Circulation. 2014;129:1-58)     LOC: 0  Normal fetal echocardiogram within the limitations of ultrasound. No changes were made to current delivery plan. Triage code 0:  Delivery per OB at patient's preferred hospital.  Standard  care per  team.  Cardiology consult not necessary, unless there are clinical concerns.       Thank you for allowing me to participate in Shannon's care.  If you have any further questions, please do not hesitate to contact me.     Ty Reyna M.D.  [facility], Director  Ambulatory  Pediatric Cardiology   Division of Pediatric Cardiology  [facility]  [facility]   of Pediatrics, [facility]  [facility] -   [address]  [place] [address]  Office:  [phone]  Fax:       [phone]  e-mail:  [email or web address]    I spent greater than 30 minutes in performance of this consultation, of which greater than 50% was related to coordination of care or counseling.

## 2024-06-07 NOTE — PROGRESS NOTES
Shannon Wright was seen at the request of Alejo Ponce MD for a chief complaint of Sjogren syndrome and anti-SSA antibody positivity; a report with my findings is being sent via written or electronic means the referring physician with my recommendations for treatment.     I had the pleasure of seeing Shannon Wright in Pediatric Cardiology consultation at our [facility] location as part of our prenatal heart program for serial fetal echocardiograms for maternal history of Sjogren syndrome and anti-SSA antibody positivity.  She is a 34 y.o. year-old  woman, currently 28w0d weeks gestation.  Patient's last menstrual period was 2023 (exact date). Estimated Date of Delivery: 24.  There have been no pregnancy complications.   She has not been hospitalized during this pregnancy.  She had a NIPT, which was normal.  She had a second trimester ultrasound which was normal.      Prior to the visit, I personally reviewed the cardiac portions of the obstetrical ultrasound performed on .  There is normal segmental anatomy with normal 4 chamber, outflow tract and 3 vessel views.  There is no evidence of septation defect, right or left ventricular outflow obstruction or significant valvular regurgitation.    Her previous obstetrical history is significant for 2 full term deliveries.  Her past medical history is significant for Sjogren syndrome anti-SSA antibody positivity.  She has no history of congenital heart disease, arrhythmia, cardiomyopathy, hypercholesterolemia, hypertension, diabetes, rheumatic heart disease, cancer, asthma, lupus, clotting disorder, depression, anxiety, alcohol abuse, phenylketonuria, or DiGeorge.  She has had 2 c-sections.  She takes Aspirin, vitamin D, B12, calcium, and folic acid.  She has No Known Allergies. She is currently taking prenatal vitamins.      Her family history is negative for early atherosclerosis, sudden cardiac death, long QT syndrome, cardiomyopathy, or  "metabolic disease.  Her father is 75yrs old and has an aortic aneurysm. She has a cousin that passed away from a PE and was found to have a clotting disorder. She has 2 maternal aunts with Sjogren syndrome and 1 maternal aunt with lupus. She has a cousin that has a 5 week baby that has a double aortic arch and recently had surgery at [facility].    She currently lives with her spouse and 2 children and is .  She works as a .  She does not smoke.  She denies illicit drug use or alcohol abuse.  She denies verbal, sexual, or physical abuse.     Delivery Hospital: [facility]  Father of the baby's name: same    /68 (BP Location: Right arm, Patient Position: Sitting, BP Cuff Size: Adult)   Pulse 71   Ht 1.617 m (5' 3.66\")   Wt 82 kg (180 lb 12.4 oz)   LMP 11/24/2023 (Exact Date)   SpO2 97%   BMI 31.36 kg/m²     She was resting comfortably in the examination room and alert, active and in no respiratory distress. Skin was without rash.  HEENT: moist mucous membranes, no JVD, goiter. Breathing is not labored.  She was acyanotic.  There was no peripheral edema.   The abdomen was gravid, soft, nontender with normal bowel sounds.  The liver was not palpable.  The spleen tip was not palpable.  She had a normal gait and normal strength in all extremities.  Cranial nerves II - XII are intact.  She had no clubbing, cyanosis, or edema.    A two-dimensional and Doppler fetal echocardiogram was performed today and interpreted by me at 28w0d weeks gestation.  The fetal echocardiogram showed normal segmental anatomy with no structural abnormalities found.  There is normal cardiac function.  There is no evidence of septation defect, right or left ventricular outflow obstruction or significant valvular regurgitation.  The fetal heart rate was within normal limits without ectopy or arrhythmia seen.  The spectral Doppler pattern across all valves, venous structures, and arterial structures was within normal " limits.  There is no pericardial effusion.  Please see full report for details.    In summary, Shannon Wright is a 34 y.o. [id number], currently 28w0d weeks gestation, who had a normal fetal echocardiogram at today's visit.  She has a history of positive anti-SSA antibodies. She has normal active thyroid.  She has no other children with complete heart block.  There is an increased risk for fetal/ lupus in the setting of hypothyroid.  There is a significant risk of fetal heart block in the setting of a previous child with heart block.  We did not prescribe any medications.  We did not recommend intervention.  As always, we recommend a heart healthy lifestyle.  She does not necessarily need to follow up with pediatric cardiology after the baby is born unless the pediatric team has any concerns or worries.     The exact prevalence of symptomatic or asymptomatic maternal autoantibody (anti-Ro/SSA or anti-La/SSB) positivity in the general population is unknown. In prospectively examined pregnancies of mothers with known antibodies and no prior affected child, the reported incidence of fetal CHB was between 1% and 5%. The number of affected pregnancies increases to 11% to 19% for those with a previously affected child with CHB.  In addition, women with both autoantibodies and hypothyroidism are at a 9-fold increased risk of having an affected fetus or  compared with those with SSA or SSB alone.   In addition to abnormalities in the conduction system, up to 10% to 15% of SSA-exposed fetuses with conduction system disease may also develop myocardial inflammation, endocardial fibroelastosis, or atrioventricular (AV) valve apparatus dysfunction.  Because of the perception that the inflammatory effects resulting from antibody exposure may be preventable if detected and treated at an early stage, it has been recommended that SSA/SSB-positive women be referred for fetal echocardiography surveillance beginning  in the early second trimester (16-18 weeks). The mechanical MN interval has been measured in fetuses at risk with the use of a variety of M-mode and pulsed Doppler techniques and compared with gestational age-adjusted normal values. Although the value of serial assessment for the detection of the progression of myocardial inflammation or conduction system disease from first-degree block (MN prolongation) to CHB has not been proved, serial assessment at 1- to 2-week intervals starting at 16 weeks and continuing through 28 weeks of gestation is reasonable to perform because the potential benefits outweigh the risks. For women who have had a previously affected child, more frequent serial assessment, at least weekly, is recommended. (Claire M, et al. Circulation. 2014;129:1-58)     LOC: 0  Normal fetal echocardiogram within the limitations of ultrasound. No changes were made to current delivery plan. Triage code 0:  Delivery per OB at patient's preferred hospital.  Standard  care per  team.  Cardiology consult not necessary, unless there are clinical concerns.       Thank you for allowing me to participate in Shannon's care.  If you have any further questions, please do not hesitate to contact me.     Ty Reyna M.D.  [facility], Director  Ambulatory Pediatric Cardiology   Division of Pediatric Cardiology  [facility]  [facility]   of Pediatrics, [facility]  [facility] [address]  [place] [address]  Office:  [phone]  Fax:       [phone]  e-mail:  [email or web address]    I spent greater than 30 minutes in performance of this consultation, of which greater than 50% was related to coordination of care or counseling.

## 2024-06-19 NOTE — PROGRESS NOTES
Subjective   Patient ID [id number]   Shannon Wright is a 34 y.o.  at 29w6d+FM. Got last ECHO.    Her pregnancy is complicated by:  Sjogrens, anti SSA positive, getting ECHOs  LTCS x 2  AMA    Objective   Physical Exam  Weight: 83 kg (183 lb)  BP: 100/62  Fetal Heart Rate: 150 Fundal Height (cm): 30 cm    Lab Results   Component Value Date    HGB 12.5 2024    HCT 38.2 2024       Assessment/Plan   Growth scan tomorrow.  No more ECHOs.  Follow up in 2 weeks for a routine prenatal visit.  
20-Jul-2023 15:33

## 2024-06-20 ENCOUNTER — APPOINTMENT (OUTPATIENT)
Dept: OBSTETRICS AND GYNECOLOGY | Facility: CLINIC | Age: 34
End: 2024-06-20
Payer: COMMERCIAL

## 2024-06-20 VITALS — BODY MASS INDEX: 31.75 KG/M2 | DIASTOLIC BLOOD PRESSURE: 62 MMHG | WEIGHT: 183 LBS | SYSTOLIC BLOOD PRESSURE: 100 MMHG

## 2024-06-20 DIAGNOSIS — Z23 NEED FOR DIPHTHERIA-TETANUS-PERTUSSIS (TDAP) VACCINE: ICD-10-CM

## 2024-06-20 DIAGNOSIS — Z34.80 SUPERVISION OF OTHER NORMAL PREGNANCY, ANTEPARTUM (HHS-HCC): ICD-10-CM

## 2024-06-20 LAB
POC GLUCOSE, URINE: NEGATIVE MG/DL
POC PROTEIN, URINE: ABNORMAL MG/DL

## 2024-06-21 ENCOUNTER — HOSPITAL ENCOUNTER (OUTPATIENT)
Dept: RADIOLOGY | Facility: CLINIC | Age: 34
Discharge: HOME | End: 2024-06-21
Payer: COMMERCIAL

## 2024-06-21 DIAGNOSIS — Z36.9 FIRST TRIMESTER SCREENING (HHS-HCC): ICD-10-CM

## 2024-06-21 PROCEDURE — 76816 OB US FOLLOW-UP PER FETUS: CPT

## 2024-07-04 NOTE — PROGRESS NOTES
Subjective   Patient ID [id number]   Shannon Wright is a 34 y.o.  at 32w3d, +FM, no cramping. BP has been low. Has felt like she is going to pass out. Does prevention with fluids, salt. Ketones in urine today.    Her pregnancy is complicated by:  Sjogrens, anti SSA positive, getting ECHOs, stopped at 28 weeks.  LTCS x 2, went into labor day before last . Had a bad experience at the Clinic, labored before going back.  AMA  POTS    Objective   Physical Exam  Weight: 84.3 kg (185 lb 12.8 oz)  BP: 96/60  Fetal Heart Rate: 150 Fundal Height (cm): 32 cm    Lab Results   Component Value Date    HGB 12.5 2024    HCT 38.2 2024       Assessment/Plan   Discussed management of POTS, needs to increase fluids and salt as needed. Discussed positional changes in pregnancy. Ketones in urine today.  Has 36 week ultrasound scheduled.  Follow up in 2 weeks for a routine prenatal visit.

## 2024-07-08 ENCOUNTER — APPOINTMENT (OUTPATIENT)
Dept: OBSTETRICS AND GYNECOLOGY | Facility: CLINIC | Age: 34
End: 2024-07-08
Payer: COMMERCIAL

## 2024-07-08 VITALS — DIASTOLIC BLOOD PRESSURE: 60 MMHG | SYSTOLIC BLOOD PRESSURE: 96 MMHG | WEIGHT: 185.8 LBS | BODY MASS INDEX: 32.23 KG/M2

## 2024-07-08 DIAGNOSIS — Z34.80 SUPERVISION OF OTHER NORMAL PREGNANCY, ANTEPARTUM (HHS-HCC): ICD-10-CM

## 2024-07-08 LAB
POC GLUCOSE, URINE: NEGATIVE MG/DL
POC KETONES, URINE: ABNORMAL MG/DL
POC PROTEIN, URINE: ABNORMAL MG/DL

## 2024-07-08 PROCEDURE — 0501F PRENATAL FLOW SHEET: CPT | Performed by: OBSTETRICS & GYNECOLOGY

## 2024-07-19 NOTE — PROGRESS NOTES
Shannon Wright is a 34 y.o.  at 34w4d with a working estimated date of delivery of 2024, by Last Menstrual Period who presents for a routine prenatal visit.   No acute concerns.  +FM. No ctx, VB or LOF.    Her pregnancy is complicated by:  - Sjogrens, anti SSA positive, getting ECHOs, discontinued at 28 weeks. managed by peds cards.   - LTCS x 2   - AMA  - POTS - symptoms are stable with this pregnancy. They improved during previous pregnancies.     Continue prenatal vitamin.  Labs reviewed.  RTC in 1 week     Scribe Attestation  By signing my name below, I, Bere Marquez, Scribe,   attest that this documentation has been prepared under the direction and in the presence of Ghassan Naidu MD.

## 2024-07-23 ENCOUNTER — APPOINTMENT (OUTPATIENT)
Dept: OBSTETRICS AND GYNECOLOGY | Facility: CLINIC | Age: 34
End: 2024-07-23
Payer: COMMERCIAL

## 2024-07-23 VITALS — WEIGHT: 188 LBS | SYSTOLIC BLOOD PRESSURE: 110 MMHG | BODY MASS INDEX: 32.61 KG/M2 | DIASTOLIC BLOOD PRESSURE: 60 MMHG

## 2024-07-23 DIAGNOSIS — Z34.80 SUPERVISION OF OTHER NORMAL PREGNANCY, ANTEPARTUM (HHS-HCC): Primary | ICD-10-CM

## 2024-07-23 LAB
POC GLUCOSE, URINE: NEGATIVE MG/DL
POC PROTEIN, URINE: ABNORMAL MG/DL

## 2024-07-23 PROCEDURE — 0501F PRENATAL FLOW SHEET: CPT | Performed by: OBSTETRICS & GYNECOLOGY

## 2024-07-30 ENCOUNTER — APPOINTMENT (OUTPATIENT)
Dept: OBSTETRICS AND GYNECOLOGY | Facility: CLINIC | Age: 34
End: 2024-07-30
Payer: COMMERCIAL

## 2024-07-30 VITALS — BODY MASS INDEX: 32.61 KG/M2 | DIASTOLIC BLOOD PRESSURE: 66 MMHG | WEIGHT: 188 LBS | SYSTOLIC BLOOD PRESSURE: 120 MMHG

## 2024-07-30 DIAGNOSIS — O09.522 MULTIGRAVIDA OF ADVANCED MATERNAL AGE IN SECOND TRIMESTER (HHS-HCC): ICD-10-CM

## 2024-07-30 DIAGNOSIS — M35.00 SJOGREN'S SYNDROME, WITH UNSPECIFIED ORGAN INVOLVEMENT (MULTI): ICD-10-CM

## 2024-07-30 DIAGNOSIS — R76.8 SS-A ANTIBODY POSITIVE: ICD-10-CM

## 2024-07-30 DIAGNOSIS — Z34.80 SUPERVISION OF OTHER NORMAL PREGNANCY, ANTEPARTUM (HHS-HCC): Primary | ICD-10-CM

## 2024-07-30 LAB
POC BLOOD, URINE: NEGATIVE
POC GLUCOSE, URINE: NEGATIVE MG/DL
POC KETONES, URINE: ABNORMAL MG/DL
POC LEUKOCYTES, URINE: ABNORMAL
POC NITRITE,URINE: NEGATIVE
POC PROTEIN, URINE: ABNORMAL MG/DL

## 2024-07-30 PROCEDURE — 0501F PRENATAL FLOW SHEET: CPT | Performed by: OBSTETRICS & GYNECOLOGY

## 2024-07-30 NOTE — PROGRESS NOTES
Subjective   Shannon Wright is a 34 y.o.  at 35w4d, presents for a routine prenatal visit.   Overall feels well. Reports good fetal movement.   Wants to schedule repeat c/s  Desires permanent sterilization at time of  section. Discussed procedure of bilateral salpingectomy at time of  section, permanence of procedure.     Objective     /66   Wt 85.3 kg (188 lb)   LMP 2023 (Exact Date)   BMI 32.61 kg/m²     Lab Results   Component Value Date    HGB 12.5 2024    HCT 38.2 2024 growth ultrasound at 30 weeks. Nl interval growth. EFW 61%ile.   Next growth ultrasound next week.     Plan    Supervision of other normal pregnancy, antepartum ([facility])  - POCT UA Automated manually resulted  - RTO one week.   - GBS next visit.   - Today scheduled repeat  section and bilateral salpingectomy for permanent sterilization. Scheduled for 2024 2pm [facility].   - Growth ultrasound this week.   - RTO one week.      Multigravida of advanced maternal age in second trimester ([facility])  SS-A antibody positive  Sjogren's syndrome  NSTs weekly 36 weeks starting next week.    Yulisa Sheth MD

## 2024-07-31 ENCOUNTER — PREP FOR PROCEDURE (OUTPATIENT)
Dept: OBSTETRICS AND GYNECOLOGY | Facility: HOSPITAL | Age: 34
End: 2024-07-31
Payer: COMMERCIAL

## 2024-07-31 DIAGNOSIS — Z34.90 TERM PREGNANCY (HHS-HCC): Primary | ICD-10-CM

## 2024-07-31 DIAGNOSIS — Z30.2 ENCOUNTER FOR FEMALE STERILIZATION PROCEDURE: ICD-10-CM

## 2024-07-31 RX ORDER — CARBOPROST TROMETHAMINE 250 UG/ML
250 INJECTION, SOLUTION INTRAMUSCULAR ONCE AS NEEDED
OUTPATIENT
Start: 2024-07-31

## 2024-07-31 RX ORDER — ONDANSETRON HYDROCHLORIDE 2 MG/ML
4 INJECTION, SOLUTION INTRAVENOUS EVERY 6 HOURS PRN
OUTPATIENT
Start: 2024-07-31

## 2024-07-31 RX ORDER — LOPERAMIDE HYDROCHLORIDE 2 MG/1
4 CAPSULE ORAL EVERY 2 HOUR PRN
OUTPATIENT
Start: 2024-07-31

## 2024-07-31 RX ORDER — LIDOCAINE HYDROCHLORIDE 10 MG/ML
30 INJECTION INFILTRATION; PERINEURAL ONCE AS NEEDED
OUTPATIENT
Start: 2024-07-31

## 2024-07-31 RX ORDER — HYDRALAZINE HYDROCHLORIDE 20 MG/ML
5 INJECTION INTRAMUSCULAR; INTRAVENOUS ONCE AS NEEDED
OUTPATIENT
Start: 2024-07-31

## 2024-07-31 RX ORDER — NIFEDIPINE 10 MG/1
10 CAPSULE ORAL ONCE AS NEEDED
OUTPATIENT
Start: 2024-07-31

## 2024-07-31 RX ORDER — OXYTOCIN 10 [USP'U]/ML
10 INJECTION, SOLUTION INTRAMUSCULAR; INTRAVENOUS ONCE AS NEEDED
OUTPATIENT
Start: 2024-07-31

## 2024-07-31 RX ORDER — METOCLOPRAMIDE HYDROCHLORIDE 5 MG/ML
10 INJECTION INTRAMUSCULAR; INTRAVENOUS EVERY 6 HOURS PRN
OUTPATIENT
Start: 2024-07-31

## 2024-07-31 RX ORDER — ONDANSETRON 4 MG/1
4 TABLET, FILM COATED ORAL EVERY 6 HOURS PRN
OUTPATIENT
Start: 2024-07-31

## 2024-07-31 RX ORDER — METOCLOPRAMIDE 5 MG/1
10 TABLET ORAL EVERY 6 HOURS PRN
OUTPATIENT
Start: 2024-07-31

## 2024-07-31 RX ORDER — TERBUTALINE SULFATE 1 MG/ML
0.25 INJECTION SUBCUTANEOUS ONCE AS NEEDED
OUTPATIENT
Start: 2024-07-31

## 2024-07-31 RX ORDER — METHYLERGONOVINE MALEATE 0.2 MG/ML
0.2 INJECTION INTRAVENOUS ONCE AS NEEDED
OUTPATIENT
Start: 2024-07-31

## 2024-07-31 RX ORDER — SODIUM CHLORIDE, SODIUM LACTATE, POTASSIUM CHLORIDE, CALCIUM CHLORIDE 600; 310; 30; 20 MG/100ML; MG/100ML; MG/100ML; MG/100ML
125 INJECTION, SOLUTION INTRAVENOUS CONTINUOUS
OUTPATIENT
Start: 2024-07-31

## 2024-07-31 RX ORDER — TRANEXAMIC ACID 100 MG/ML
1000 INJECTION, SOLUTION INTRAVENOUS ONCE AS NEEDED
OUTPATIENT
Start: 2024-07-31

## 2024-07-31 RX ORDER — LABETALOL HYDROCHLORIDE 5 MG/ML
20 INJECTION, SOLUTION INTRAVENOUS ONCE AS NEEDED
OUTPATIENT
Start: 2024-07-31

## 2024-07-31 RX ORDER — MISOPROSTOL 100 UG/1
800 TABLET ORAL ONCE AS NEEDED
OUTPATIENT
Start: 2024-07-31

## 2024-08-01 ENCOUNTER — HOSPITAL ENCOUNTER (OUTPATIENT)
Dept: RADIOLOGY | Facility: CLINIC | Age: 34
Discharge: HOME | End: 2024-08-01
Payer: COMMERCIAL

## 2024-08-01 DIAGNOSIS — Z36.9 FIRST TRIMESTER SCREENING (HHS-HCC): ICD-10-CM

## 2024-08-01 PROCEDURE — 76819 FETAL BIOPHYS PROFIL W/O NST: CPT

## 2024-08-01 PROCEDURE — 76816 OB US FOLLOW-UP PER FETUS: CPT | Performed by: OBSTETRICS & GYNECOLOGY

## 2024-08-01 PROCEDURE — 76819 FETAL BIOPHYS PROFIL W/O NST: CPT | Performed by: OBSTETRICS & GYNECOLOGY

## 2024-08-01 PROCEDURE — 76816 OB US FOLLOW-UP PER FETUS: CPT

## 2024-08-05 ENCOUNTER — APPOINTMENT (OUTPATIENT)
Dept: OBSTETRICS AND GYNECOLOGY | Facility: CLINIC | Age: 34
End: 2024-08-05
Payer: COMMERCIAL

## 2024-08-05 VITALS — BODY MASS INDEX: 32.93 KG/M2 | WEIGHT: 189.8 LBS | SYSTOLIC BLOOD PRESSURE: 114 MMHG | DIASTOLIC BLOOD PRESSURE: 68 MMHG

## 2024-08-05 DIAGNOSIS — M35.00 SJOGREN'S SYNDROME, WITH UNSPECIFIED ORGAN INVOLVEMENT (MULTI): Primary | ICD-10-CM

## 2024-08-05 DIAGNOSIS — Z34.80 SUPERVISION OF OTHER NORMAL PREGNANCY, ANTEPARTUM (HHS-HCC): ICD-10-CM

## 2024-08-05 PROCEDURE — 87081 CULTURE SCREEN ONLY: CPT

## 2024-08-05 PROCEDURE — 81003 URINALYSIS AUTO W/O SCOPE: CPT | Performed by: OBSTETRICS & GYNECOLOGY

## 2024-08-05 PROCEDURE — 59025 FETAL NON-STRESS TEST: CPT | Performed by: OBSTETRICS & GYNECOLOGY

## 2024-08-05 PROCEDURE — 99213 OFFICE O/P EST LOW 20 MIN: CPT | Performed by: OBSTETRICS & GYNECOLOGY

## 2024-08-05 NOTE — PATIENT INSTRUCTIONS
You were seen in the office today for routine OB care and had normal findings on exam  Continue routine OB precautions at home  Continue taking prenatal vitamins   Avoid sick contacts and consider getting your Flu (available in office during flu season) and COVID vaccines to protect against infection in pregnancy  We recommend getting the Tdap (available in office) and RSV vaccines to pass some immunity from mother to baby and protect your baby against RSV and Whooping cough in the first few months of life. Tdap can be given between 27 and 36 weeks, and RSV vaccinations can be given between 32 and 36 weeks gestation  Make an appointment for routine care in the office in the next 2 weeks  If you are having any painful contractions, vaginal bleeding, leaking amniotic fluid, or decrease in baby's movements prior to your next visit please call the office to speak to the physician on call. This includes after hours, weekends, and holidays, when the answering service will be able to connect you with the physician on call. [phone] ([place] [facility]) or [phone] ([place] Office).

## 2024-08-05 NOTE — PROGRESS NOTES
Subjective   Patient ID [id number]   Shannon Wright is a 34 y.o.  at 36w3d with a working estimated date of delivery of 2024, by Last Menstrual Period who presents for a routine prenatal visit. She denies vaginal bleeding, leakage of fluid, decreased fetal movements, or contractions.    Her pregnancy is complicated by:  Hx CS  AMA  Hx Sjogren's    Objective   Physical Exam:   Weight: 86.1 kg (189 lb 12.8 oz)  Expected Total Weight Gain: 7 kg (15 lb)-11.5 kg (25 lb)   Pregravid BMI: 29.32  BP: 114/68                  Prenatal Labs  Urine Dip:  Lab Results   Component Value Date    KETONESU NEGATIVE 2024     Lab Results   Component Value Date    HGB 12.5 2024    HCT 38.2 2024    ABO O 2024    HEPBSAG Nonreactive 2024           Imaging: vertex at 36 weeks    Assessment/Plan   Problem List Items Addressed This Visit             ICD-10-CM    Sjogren's syndrome (Multi) - Primary M35.00    Relevant Orders    Fetal nonstress test    Supervision of other normal pregnancy, antepartum ([facility]) Z34.80    Relevant Orders    POCT UA Automated manually resulted (Completed)    Group B Streptococcus (GBS) Prenatal Screen, Culture     Continue prenatal vitamin.  Labs reviewed.  GBS taken.  Expected mode of delivery LTCS  Follow up in 1 week for a routine prenatal visit.  Jenny Blake DO

## 2024-08-05 NOTE — PROCEDURES
Shannon Wright, a  at 36w3d with an EVIE of 2024, by Last Menstrual Period, was seen at [facility] for a nonstress test.    Non-Stress Test   Baseline Fetal Heart Rate for Non-Stress Test: 140 BPM  Variability in Waveform for Non-Stress Test: Moderate  Accelerations in Non-Stress Test: Yes  Decelerations in Non-Stress Test: None  Contractions in Non-Stress Test: Not present  Acoustic Stimulator for Non-Stress Test: No  Interpretation of Non-Stress Test   Interpretation of Non-Stress Test: Reactive  Comments on Non-Stress Test: Reassuring  NST for Multiple Fetuses: No                                  Jenny Blake, DO

## 2024-08-07 ENCOUNTER — APPOINTMENT (OUTPATIENT)
Dept: OBSTETRICS AND GYNECOLOGY | Facility: CLINIC | Age: 34
End: 2024-08-07
Payer: COMMERCIAL

## 2024-08-08 LAB — GP B STREP GENITAL QL CULT: NORMAL

## 2024-08-13 ENCOUNTER — APPOINTMENT (OUTPATIENT)
Dept: OBSTETRICS AND GYNECOLOGY | Facility: CLINIC | Age: 34
End: 2024-08-13
Payer: COMMERCIAL

## 2024-08-13 VITALS — SYSTOLIC BLOOD PRESSURE: 122 MMHG | WEIGHT: 193 LBS | DIASTOLIC BLOOD PRESSURE: 76 MMHG | BODY MASS INDEX: 33.48 KG/M2

## 2024-08-13 DIAGNOSIS — R76.8 SS-A ANTIBODY POSITIVE: ICD-10-CM

## 2024-08-13 DIAGNOSIS — Z34.80 SUPERVISION OF OTHER NORMAL PREGNANCY, ANTEPARTUM (HHS-HCC): Primary | ICD-10-CM

## 2024-08-13 DIAGNOSIS — O09.523 MULTIGRAVIDA OF ADVANCED MATERNAL AGE IN THIRD TRIMESTER (HHS-HCC): ICD-10-CM

## 2024-08-13 PROCEDURE — 81003 URINALYSIS AUTO W/O SCOPE: CPT | Performed by: OBSTETRICS & GYNECOLOGY

## 2024-08-13 PROCEDURE — 59025 FETAL NON-STRESS TEST: CPT | Performed by: OBSTETRICS & GYNECOLOGY

## 2024-08-13 PROCEDURE — 99213 OFFICE O/P EST LOW 20 MIN: CPT | Performed by: OBSTETRICS & GYNECOLOGY

## 2024-08-13 NOTE — PROGRESS NOTES
Subjective   Shannon Wright is a 34 y.o.  at 37w4d, presents for a routine prenatal visit.   Feels well. Good fetal movement.    Objective     /76   Wt 87.5 kg (193 lb)   LMP 2023 (Exact Date)   BMI 33.48 kg/m²     Lab Results   Component Value Date    HGB 12.5 2024    HCT 38.2 2024     GBS negative    Non-Stress Test   Baseline Fetal Heart Rate for Non-Stress Test: 140 BPM  Variability in Waveform for Non-Stress Test: Moderate  Accelerations in Non-Stress Test: Yes, lasting at least 15 seconds, greater than/equal to 15 bpm  Decelerations in Non-Stress Test: None  Contractions in Non-Stress Test: Not present  Acoustic Stimulator for Non-Stress Test: No  Interpretation of Non-Stress Test   Interpretation of Non-Stress Test: Reactive  Comments on Non-Stress Test: reactive reassuring NST.  NST for Multiple Fetuses: No    Plan  37 4/7 wks    Supervision of other normal pregnancy, antepartum ([facility])  - POCT UA Automated manually resulted  Multigravida of advanced maternal age  SS-A antibody positive  - Fetal nonstress test   Scheduled for  section and bilateral salpingectomy 2024  RTO one week.    Yulisa Sheth MD

## 2024-08-20 ENCOUNTER — APPOINTMENT (OUTPATIENT)
Dept: OBSTETRICS AND GYNECOLOGY | Facility: CLINIC | Age: 34
End: 2024-08-20
Payer: COMMERCIAL

## 2024-08-20 VITALS — SYSTOLIC BLOOD PRESSURE: 132 MMHG | BODY MASS INDEX: 33.83 KG/M2 | DIASTOLIC BLOOD PRESSURE: 86 MMHG | WEIGHT: 195 LBS

## 2024-08-20 DIAGNOSIS — M35.00 SJOGREN'S SYNDROME, WITH UNSPECIFIED ORGAN INVOLVEMENT (MULTI): ICD-10-CM

## 2024-08-20 DIAGNOSIS — Z34.80 SUPERVISION OF OTHER NORMAL PREGNANCY, ANTEPARTUM (HHS-HCC): Primary | ICD-10-CM

## 2024-08-20 DIAGNOSIS — R76.8 SS-A ANTIBODY POSITIVE: ICD-10-CM

## 2024-08-20 LAB
POC BLOOD, URINE: NEGATIVE
POC GLUCOSE, URINE: NEGATIVE MG/DL
POC KETONES, URINE: ABNORMAL MG/DL
POC LEUKOCYTES, URINE: NEGATIVE
POC NITRITE,URINE: NEGATIVE
POC PROTEIN, URINE: ABNORMAL MG/DL

## 2024-08-20 PROCEDURE — 81003 URINALYSIS AUTO W/O SCOPE: CPT | Performed by: OBSTETRICS & GYNECOLOGY

## 2024-08-20 PROCEDURE — 99213 OFFICE O/P EST LOW 20 MIN: CPT | Performed by: OBSTETRICS & GYNECOLOGY

## 2024-08-20 PROCEDURE — 59025 FETAL NON-STRESS TEST: CPT | Performed by: OBSTETRICS & GYNECOLOGY

## 2024-08-20 NOTE — PROGRESS NOTES
Subjective   Shannon Wright is a 34 y.o.  at 38w4d, presents for a routine prenatal visit.   Feels well, occ contractions, feels them in her back.    Objective     /86   Wt 88.5 kg (195 lb)   LMP 2023 (Exact Date)   BMI 33.83 kg/m²     Lab Results   Component Value Date    HGB 12.5 2024    HCT 38.2 2024     Non-Stress Test   Baseline Fetal Heart Rate for Non-Stress Test: 140 BPM  Variability in Waveform for Non-Stress Test: Moderate  Accelerations in Non-Stress Test: Yes  Decelerations in Non-Stress Test: None  Contractions in Non-Stress Test: Irregular  Acoustic Stimulator for Non-Stress Test: No  Interpretation of Non-Stress Test   Interpretation of Non-Stress Test: Reactive  Comments on Non-Stress Test: reactive and reassuring.  NST for Multiple Fetuses: No    Plan  1. Supervision of other normal pregnancy, antepartum ([facility])  Discussed plan and expectations of repeat c/s and bilateral salpingectomy for Friday.  - POCT UA Automated manually resulted    2. Sjogren's syndrome, with unspecified organ involvement (Multi)  3. SS-A antibody positive  - Fetal nonstress test reactive and reassuring.      Yulisa Sheth MD

## 2024-08-23 ENCOUNTER — ANESTHESIA (OUTPATIENT)
Dept: OBSTETRICS AND GYNECOLOGY | Facility: HOSPITAL | Age: 34
End: 2024-08-23
Payer: COMMERCIAL

## 2024-08-23 ENCOUNTER — HOSPITAL ENCOUNTER (INPATIENT)
Facility: HOSPITAL | Age: 34
LOS: 2 days | Discharge: HOME | End: 2024-08-25
Attending: OBSTETRICS & GYNECOLOGY | Admitting: OBSTETRICS & GYNECOLOGY
Payer: COMMERCIAL

## 2024-08-23 ENCOUNTER — ANESTHESIA EVENT (OUTPATIENT)
Dept: OBSTETRICS AND GYNECOLOGY | Facility: HOSPITAL | Age: 34
End: 2024-08-23
Payer: COMMERCIAL

## 2024-08-23 DIAGNOSIS — Z30.2 STERILIZATION: ICD-10-CM

## 2024-08-23 DIAGNOSIS — Z98.891 STATUS POST REPEAT LOW TRANSVERSE CESAREAN SECTION: Primary | ICD-10-CM

## 2024-08-23 PROBLEM — Z34.90 TERM PREGNANCY (HHS-HCC): Status: RESOLVED | Noted: 2024-08-23 | Resolved: 2024-08-23

## 2024-08-23 PROBLEM — O09.523 MULTIGRAVIDA OF ADVANCED MATERNAL AGE IN THIRD TRIMESTER (HHS-HCC): Status: ACTIVE | Noted: 2024-05-24

## 2024-08-23 PROBLEM — Z34.90 TERM PREGNANCY (HHS-HCC): Status: ACTIVE | Noted: 2024-08-23

## 2024-08-23 PROBLEM — N85.A UTERINE SCAR FROM PREVIOUS CESAREAN DELIVERY: Status: ACTIVE | Noted: 2024-08-23

## 2024-08-23 PROBLEM — Z3A.39 39 WEEKS GESTATION OF PREGNANCY (HHS-HCC): Status: RESOLVED | Noted: 2024-08-23 | Resolved: 2024-08-23

## 2024-08-23 PROBLEM — N85.A UTERINE SCAR FROM PREVIOUS CESAREAN DELIVERY: Status: RESOLVED | Noted: 2024-08-23 | Resolved: 2024-08-23

## 2024-08-23 PROBLEM — Z3A.39 39 WEEKS GESTATION OF PREGNANCY (HHS-HCC): Status: ACTIVE | Noted: 2024-08-23

## 2024-08-23 LAB
ABO GROUP (TYPE) IN BLOOD: NORMAL
ANTIBODY SCREEN: NORMAL
ERYTHROCYTE [DISTWIDTH] IN BLOOD BY AUTOMATED COUNT: 14 % (ref 11.5–14.5)
HCT VFR BLD AUTO: 41.8 % (ref 36–46)
HGB BLD-MCNC: 14.2 G/DL (ref 12–16)
MCH RBC QN AUTO: 30.5 PG (ref 26–34)
MCHC RBC AUTO-ENTMCNC: 34 G/DL (ref 32–36)
MCV RBC AUTO: 90 FL (ref 80–100)
NRBC BLD-RTO: 0 /100 WBCS (ref 0–0)
PLATELET # BLD AUTO: 207 X10*3/UL (ref 150–450)
RBC # BLD AUTO: 4.65 X10*6/UL (ref 4–5.2)
RH FACTOR (ANTIGEN D): NORMAL
WBC # BLD AUTO: 9 X10*3/UL (ref 4.4–11.3)

## 2024-08-23 PROCEDURE — 2720000007 HC OR 272 NO HCPCS

## 2024-08-23 PROCEDURE — 2500000004 HC RX 250 GENERAL PHARMACY W/ HCPCS (ALT 636 FOR OP/ED): Performed by: OBSTETRICS & GYNECOLOGY

## 2024-08-23 PROCEDURE — 3700000018 HC OB ANESTHESIA C-SECTION: Performed by: OBSTETRICS & GYNECOLOGY

## 2024-08-23 PROCEDURE — 0UB70ZZ EXCISION OF BILATERAL FALLOPIAN TUBES, OPEN APPROACH: ICD-10-PCS | Performed by: OBSTETRICS & GYNECOLOGY

## 2024-08-23 PROCEDURE — 88302 TISSUE EXAM BY PATHOLOGIST: CPT | Mod: TC,GEALAB | Performed by: OBSTETRICS & GYNECOLOGY

## 2024-08-23 PROCEDURE — 59510 CESAREAN DELIVERY: CPT | Performed by: OBSTETRICS & GYNECOLOGY

## 2024-08-23 PROCEDURE — 85027 COMPLETE CBC AUTOMATED: CPT | Performed by: OBSTETRICS & GYNECOLOGY

## 2024-08-23 PROCEDURE — 36415 COLL VENOUS BLD VENIPUNCTURE: CPT | Performed by: OBSTETRICS & GYNECOLOGY

## 2024-08-23 PROCEDURE — 59514 CESAREAN DELIVERY ONLY: CPT | Performed by: OBSTETRICS & GYNECOLOGY

## 2024-08-23 PROCEDURE — 2500000005 HC RX 250 GENERAL PHARMACY W/O HCPCS: Performed by: NURSE ANESTHETIST, CERTIFIED REGISTERED

## 2024-08-23 PROCEDURE — 2500000004 HC RX 250 GENERAL PHARMACY W/ HCPCS (ALT 636 FOR OP/ED): Performed by: NURSE ANESTHETIST, CERTIFIED REGISTERED

## 2024-08-23 PROCEDURE — 86901 BLOOD TYPING SEROLOGIC RH(D): CPT | Performed by: OBSTETRICS & GYNECOLOGY

## 2024-08-23 PROCEDURE — 2500000001 HC RX 250 WO HCPCS SELF ADMINISTERED DRUGS (ALT 637 FOR MEDICARE OP): Performed by: NURSE ANESTHETIST, CERTIFIED REGISTERED

## 2024-08-23 PROCEDURE — 59050 FETAL MONITOR W/REPORT: CPT

## 2024-08-23 PROCEDURE — 1120000001 HC OB PRIVATE ROOM DAILY

## 2024-08-23 PROCEDURE — 2720000007 HC OR 272 NO HCPCS: Performed by: OBSTETRICS & GYNECOLOGY

## 2024-08-23 PROCEDURE — 51702 INSERT TEMP BLADDER CATH: CPT

## 2024-08-23 PROCEDURE — 88302 TISSUE EXAM BY PATHOLOGIST: CPT | Performed by: PATHOLOGY

## 2024-08-23 PROCEDURE — 58611 LIGATE OVIDUCT(S) ADD-ON: CPT | Performed by: OBSTETRICS & GYNECOLOGY

## 2024-08-23 PROCEDURE — 7100000016 HC LABOR RECOVERY PER HOUR: Performed by: OBSTETRICS & GYNECOLOGY

## 2024-08-23 PROCEDURE — 7100000016 HC LABOR RECOVERY PER HOUR

## 2024-08-23 RX ORDER — SIMETHICONE 80 MG
80 TABLET,CHEWABLE ORAL 4 TIMES DAILY PRN
Status: DISCONTINUED | OUTPATIENT
Start: 2024-08-23 | End: 2024-08-25 | Stop reason: HOSPADM

## 2024-08-23 RX ORDER — OXYTOCIN 10 [USP'U]/ML
10 INJECTION, SOLUTION INTRAMUSCULAR; INTRAVENOUS ONCE AS NEEDED
Status: DISCONTINUED | OUTPATIENT
Start: 2024-08-23 | End: 2024-08-25 | Stop reason: HOSPADM

## 2024-08-23 RX ORDER — LIDOCAINE 560 MG/1
1 PATCH PERCUTANEOUS; TOPICAL; TRANSDERMAL
Status: DISCONTINUED | OUTPATIENT
Start: 2024-08-23 | End: 2024-08-25 | Stop reason: HOSPADM

## 2024-08-23 RX ORDER — CEFAZOLIN SODIUM 2 G/100ML
2 INJECTION, SOLUTION INTRAVENOUS ONCE
Status: COMPLETED | OUTPATIENT
Start: 2024-08-23 | End: 2024-08-23

## 2024-08-23 RX ORDER — ONDANSETRON HYDROCHLORIDE 2 MG/ML
4 INJECTION, SOLUTION INTRAVENOUS EVERY 6 HOURS PRN
Status: DISCONTINUED | OUTPATIENT
Start: 2024-08-23 | End: 2024-08-25 | Stop reason: HOSPADM

## 2024-08-23 RX ORDER — SODIUM CHLORIDE, SODIUM LACTATE, POTASSIUM CHLORIDE, CALCIUM CHLORIDE 600; 310; 30; 20 MG/100ML; MG/100ML; MG/100ML; MG/100ML
125 INJECTION, SOLUTION INTRAVENOUS CONTINUOUS
Status: DISCONTINUED | OUTPATIENT
Start: 2024-08-23 | End: 2024-08-25 | Stop reason: HOSPADM

## 2024-08-23 RX ORDER — MISOPROSTOL 200 UG/1
800 TABLET ORAL ONCE AS NEEDED
Status: DISCONTINUED | OUTPATIENT
Start: 2024-08-23 | End: 2024-08-25 | Stop reason: HOSPADM

## 2024-08-23 RX ORDER — NORETHINDRONE AND ETHINYL ESTRADIOL 0.5-0.035
KIT ORAL AS NEEDED
Status: DISCONTINUED | OUTPATIENT
Start: 2024-08-23 | End: 2024-08-23

## 2024-08-23 RX ORDER — METOCLOPRAMIDE 10 MG/1
10 TABLET ORAL EVERY 6 HOURS PRN
Status: DISCONTINUED | OUTPATIENT
Start: 2024-08-23 | End: 2024-08-25 | Stop reason: HOSPADM

## 2024-08-23 RX ORDER — OXYTOCIN/0.9 % SODIUM CHLORIDE 30/500 ML
60 PLASTIC BAG, INJECTION (ML) INTRAVENOUS ONCE AS NEEDED
Status: DISCONTINUED | OUTPATIENT
Start: 2024-08-23 | End: 2024-08-25 | Stop reason: HOSPADM

## 2024-08-23 RX ORDER — SCOLOPAMINE TRANSDERMAL SYSTEM 1 MG/1
1 PATCH, EXTENDED RELEASE TRANSDERMAL ONCE AS NEEDED
Status: DISCONTINUED | OUTPATIENT
Start: 2024-08-23 | End: 2024-08-25 | Stop reason: HOSPADM

## 2024-08-23 RX ORDER — METHYLERGONOVINE MALEATE 0.2 MG/ML
0.2 INJECTION INTRAVENOUS ONCE AS NEEDED
Status: DISCONTINUED | OUTPATIENT
Start: 2024-08-23 | End: 2024-08-25 | Stop reason: HOSPADM

## 2024-08-23 RX ORDER — OXYCODONE HYDROCHLORIDE 5 MG/1
5 TABLET ORAL EVERY 4 HOURS PRN
Status: DISCONTINUED | OUTPATIENT
Start: 2024-08-24 | End: 2024-08-25 | Stop reason: HOSPADM

## 2024-08-23 RX ORDER — CARBOPROST TROMETHAMINE 250 UG/ML
250 INJECTION, SOLUTION INTRAMUSCULAR ONCE AS NEEDED
Status: DISCONTINUED | OUTPATIENT
Start: 2024-08-23 | End: 2024-08-25 | Stop reason: HOSPADM

## 2024-08-23 RX ORDER — ENOXAPARIN SODIUM 100 MG/ML
40 INJECTION SUBCUTANEOUS EVERY 24 HOURS
Status: DISCONTINUED | OUTPATIENT
Start: 2024-08-24 | End: 2024-08-25 | Stop reason: HOSPADM

## 2024-08-23 RX ORDER — TRANEXAMIC ACID 100 MG/ML
1000 INJECTION, SOLUTION INTRAVENOUS ONCE AS NEEDED
Status: DISCONTINUED | OUTPATIENT
Start: 2024-08-23 | End: 2024-08-25 | Stop reason: HOSPADM

## 2024-08-23 RX ORDER — POLYETHYLENE GLYCOL 3350 17 G/17G
17 POWDER, FOR SOLUTION ORAL 2 TIMES DAILY PRN
Status: DISCONTINUED | OUTPATIENT
Start: 2024-08-23 | End: 2024-08-25 | Stop reason: HOSPADM

## 2024-08-23 RX ORDER — DIPHENHYDRAMINE HYDROCHLORIDE 50 MG/ML
25 INJECTION INTRAMUSCULAR; INTRAVENOUS EVERY 4 HOURS PRN
Status: DISCONTINUED | OUTPATIENT
Start: 2024-08-23 | End: 2024-08-25 | Stop reason: HOSPADM

## 2024-08-23 RX ORDER — ACETAMINOPHEN 650 MG/1
SUPPOSITORY RECTAL AS NEEDED
Status: DISCONTINUED | OUTPATIENT
Start: 2024-08-23 | End: 2024-08-23

## 2024-08-23 RX ORDER — NIFEDIPINE 10 MG/1
10 CAPSULE ORAL ONCE AS NEEDED
Status: DISCONTINUED | OUTPATIENT
Start: 2024-08-23 | End: 2024-08-25 | Stop reason: HOSPADM

## 2024-08-23 RX ORDER — MORPHINE SULFATE 0.5 MG/ML
INJECTION, SOLUTION EPIDURAL; INTRATHECAL; INTRAVENOUS AS NEEDED
Status: DISCONTINUED | OUTPATIENT
Start: 2024-08-23 | End: 2024-08-23

## 2024-08-23 RX ORDER — OXYTOCIN/0.9 % SODIUM CHLORIDE 30/500 ML
PLASTIC BAG, INJECTION (ML) INTRAVENOUS CONTINUOUS PRN
Status: DISCONTINUED | OUTPATIENT
Start: 2024-08-23 | End: 2024-08-23

## 2024-08-23 RX ORDER — PHENYLEPHRINE HCL IN 0.9% NACL 0.4MG/10ML
SYRINGE (ML) INTRAVENOUS AS NEEDED
Status: DISCONTINUED | OUTPATIENT
Start: 2024-08-23 | End: 2024-08-23

## 2024-08-23 RX ORDER — LOPERAMIDE HYDROCHLORIDE 2 MG/1
4 CAPSULE ORAL EVERY 2 HOUR PRN
Status: DISCONTINUED | OUTPATIENT
Start: 2024-08-23 | End: 2024-08-25 | Stop reason: HOSPADM

## 2024-08-23 RX ORDER — ADHESIVE BANDAGE
10 BANDAGE TOPICAL
Status: DISCONTINUED | OUTPATIENT
Start: 2024-08-23 | End: 2024-08-25 | Stop reason: HOSPADM

## 2024-08-23 RX ORDER — LABETALOL HYDROCHLORIDE 5 MG/ML
20 INJECTION, SOLUTION INTRAVENOUS ONCE AS NEEDED
Status: DISCONTINUED | OUTPATIENT
Start: 2024-08-23 | End: 2024-08-25 | Stop reason: HOSPADM

## 2024-08-23 RX ORDER — SODIUM CHLORIDE, SODIUM LACTATE, POTASSIUM CHLORIDE, CALCIUM CHLORIDE 600; 310; 30; 20 MG/100ML; MG/100ML; MG/100ML; MG/100ML
20 INJECTION, SOLUTION INTRAVENOUS CONTINUOUS
Status: DISCONTINUED | OUTPATIENT
Start: 2024-08-23 | End: 2024-08-25 | Stop reason: HOSPADM

## 2024-08-23 RX ORDER — HYDROMORPHONE HYDROCHLORIDE 1 MG/ML
0.2 INJECTION, SOLUTION INTRAMUSCULAR; INTRAVENOUS; SUBCUTANEOUS EVERY 5 MIN PRN
Status: CANCELLED | OUTPATIENT
Start: 2024-08-23

## 2024-08-23 RX ORDER — FAMOTIDINE 10 MG/ML
20 INJECTION INTRAVENOUS ONCE
Status: COMPLETED | OUTPATIENT
Start: 2024-08-23 | End: 2024-08-23

## 2024-08-23 RX ORDER — METOCLOPRAMIDE HYDROCHLORIDE 5 MG/ML
10 INJECTION INTRAMUSCULAR; INTRAVENOUS ONCE
Status: COMPLETED | OUTPATIENT
Start: 2024-08-23 | End: 2024-08-23

## 2024-08-23 RX ORDER — BISACODYL 10 MG/1
10 SUPPOSITORY RECTAL DAILY PRN
Status: DISCONTINUED | OUTPATIENT
Start: 2024-08-23 | End: 2024-08-25 | Stop reason: HOSPADM

## 2024-08-23 RX ORDER — SODIUM CITRATE AND CITRIC ACID MONOHYDRATE 334; 500 MG/5ML; MG/5ML
30 SOLUTION ORAL ONCE
Status: DISCONTINUED | OUTPATIENT
Start: 2024-08-23 | End: 2024-08-25 | Stop reason: HOSPADM

## 2024-08-23 RX ORDER — KETOROLAC TROMETHAMINE 30 MG/ML
30 INJECTION, SOLUTION INTRAMUSCULAR; INTRAVENOUS EVERY 6 HOURS
Status: COMPLETED | OUTPATIENT
Start: 2024-08-23 | End: 2024-08-24

## 2024-08-23 RX ORDER — BUPIVACAINE HYDROCHLORIDE 7.5 MG/ML
INJECTION, SOLUTION INTRASPINAL AS NEEDED
Status: DISCONTINUED | OUTPATIENT
Start: 2024-08-23 | End: 2024-08-23

## 2024-08-23 RX ORDER — NALOXONE HYDROCHLORIDE 0.4 MG/ML
0.1 INJECTION, SOLUTION INTRAMUSCULAR; INTRAVENOUS; SUBCUTANEOUS EVERY 5 MIN PRN
Status: DISCONTINUED | OUTPATIENT
Start: 2024-08-23 | End: 2024-08-25 | Stop reason: HOSPADM

## 2024-08-23 RX ORDER — OXYCODONE HYDROCHLORIDE 5 MG/1
10 TABLET ORAL EVERY 4 HOURS PRN
Status: DISCONTINUED | OUTPATIENT
Start: 2024-08-24 | End: 2024-08-25 | Stop reason: HOSPADM

## 2024-08-23 RX ORDER — ONDANSETRON 4 MG/1
4 TABLET, FILM COATED ORAL EVERY 6 HOURS PRN
Status: DISCONTINUED | OUTPATIENT
Start: 2024-08-23 | End: 2024-08-25 | Stop reason: HOSPADM

## 2024-08-23 RX ORDER — LIDOCAINE HYDROCHLORIDE 10 MG/ML
30 INJECTION INFILTRATION; PERINEURAL ONCE AS NEEDED
Status: DISCONTINUED | OUTPATIENT
Start: 2024-08-23 | End: 2024-08-25 | Stop reason: HOSPADM

## 2024-08-23 RX ORDER — DIPHENHYDRAMINE HCL 25 MG
25 CAPSULE ORAL EVERY 4 HOURS PRN
Status: DISCONTINUED | OUTPATIENT
Start: 2024-08-23 | End: 2024-08-25 | Stop reason: HOSPADM

## 2024-08-23 RX ORDER — HYDRALAZINE HYDROCHLORIDE 20 MG/ML
5 INJECTION INTRAMUSCULAR; INTRAVENOUS ONCE AS NEEDED
Status: DISCONTINUED | OUTPATIENT
Start: 2024-08-23 | End: 2024-08-25 | Stop reason: HOSPADM

## 2024-08-23 RX ORDER — KETOROLAC TROMETHAMINE 30 MG/ML
INJECTION, SOLUTION INTRAMUSCULAR; INTRAVENOUS AS NEEDED
Status: DISCONTINUED | OUTPATIENT
Start: 2024-08-23 | End: 2024-08-23

## 2024-08-23 RX ORDER — TERBUTALINE SULFATE 1 MG/ML
0.25 INJECTION SUBCUTANEOUS ONCE AS NEEDED
Status: DISCONTINUED | OUTPATIENT
Start: 2024-08-23 | End: 2024-08-25 | Stop reason: HOSPADM

## 2024-08-23 RX ORDER — IBUPROFEN 600 MG/1
600 TABLET ORAL EVERY 6 HOURS
Status: DISCONTINUED | OUTPATIENT
Start: 2024-08-24 | End: 2024-08-25 | Stop reason: HOSPADM

## 2024-08-23 RX ORDER — METOCLOPRAMIDE HYDROCHLORIDE 5 MG/ML
10 INJECTION INTRAMUSCULAR; INTRAVENOUS EVERY 6 HOURS PRN
Status: DISCONTINUED | OUTPATIENT
Start: 2024-08-23 | End: 2024-08-25 | Stop reason: HOSPADM

## 2024-08-23 RX ORDER — ACETAMINOPHEN 325 MG/1
975 TABLET ORAL EVERY 6 HOURS
Status: DISCONTINUED | OUTPATIENT
Start: 2024-08-23 | End: 2024-08-25 | Stop reason: HOSPADM

## 2024-08-23 RX ORDER — HYDROMORPHONE HYDROCHLORIDE 1 MG/ML
0.2 INJECTION, SOLUTION INTRAMUSCULAR; INTRAVENOUS; SUBCUTANEOUS EVERY 5 MIN PRN
Status: DISCONTINUED | OUTPATIENT
Start: 2024-08-23 | End: 2024-08-25 | Stop reason: HOSPADM

## 2024-08-23 SDOH — ECONOMIC STABILITY: FOOD INSECURITY: WITHIN THE PAST 12 MONTHS, THE FOOD YOU BOUGHT JUST DIDN'T LAST AND YOU DIDN'T HAVE MONEY TO GET MORE.: NEVER TRUE

## 2024-08-23 SDOH — SOCIAL STABILITY: SOCIAL INSECURITY: ARE YOU OR HAVE YOU BEEN THREATENED OR ABUSED PHYSICALLY, EMOTIONALLY, OR SEXUALLY BY ANYONE?: NO

## 2024-08-23 SDOH — HEALTH STABILITY: MENTAL HEALTH: HAVE YOU USED ANY SUBSTANCES (CANABIS, COCAINE, HEROIN, HALLUCINOGENS, INHALANTS, ETC.) IN THE PAST 12 MONTHS?: NO

## 2024-08-23 SDOH — HEALTH STABILITY: MENTAL HEALTH
HOW OFTEN DO YOU NEED TO HAVE SOMEONE HELP YOU WHEN YOU READ INSTRUCTIONS, PAMPHLETS, OR OTHER WRITTEN MATERIAL FROM YOUR DOCTOR OR PHARMACY?: NEVER

## 2024-08-23 SDOH — HEALTH STABILITY: MENTAL HEALTH: SUICIDAL BEHAVIOR (LIFETIME): NO

## 2024-08-23 SDOH — HEALTH STABILITY: MENTAL HEALTH: HAVE YOU USED ANY PRESCRIPTION DRUGS OTHER THAN PRESCRIBED IN THE PAST 12 MONTHS?: NO

## 2024-08-23 SDOH — HEALTH STABILITY: MENTAL HEALTH
STRESS IS WHEN SOMEONE FEELS TENSE, NERVOUS, ANXIOUS, OR CAN'T SLEEP AT NIGHT BECAUSE THEIR MIND IS TROUBLED. HOW STRESSED ARE YOU?: NOT AT ALL

## 2024-08-23 SDOH — SOCIAL STABILITY: SOCIAL INSECURITY: VERBAL ABUSE: DENIES

## 2024-08-23 SDOH — SOCIAL STABILITY: SOCIAL INSECURITY: DO YOU FEEL ANYONE HAS EXPLOITED OR TAKEN ADVANTAGE OF YOU FINANCIALLY OR OF YOUR PERSONAL PROPERTY?: NO

## 2024-08-23 SDOH — SOCIAL STABILITY: SOCIAL INSECURITY: ABUSE SCREEN: ADULT

## 2024-08-23 SDOH — SOCIAL STABILITY: SOCIAL INSECURITY: ARE THERE ANY APPARENT SIGNS OF INJURIES/BEHAVIORS THAT COULD BE RELATED TO ABUSE/NEGLECT?: NO

## 2024-08-23 SDOH — HEALTH STABILITY: MENTAL HEALTH: WISH TO BE DEAD (PAST 1 MONTH): NO

## 2024-08-23 SDOH — HEALTH STABILITY: MENTAL HEALTH: WERE YOU ABLE TO COMPLETE ALL THE BEHAVIORAL HEALTH SCREENINGS?: YES

## 2024-08-23 SDOH — SOCIAL STABILITY: SOCIAL INSECURITY: DOES ANYONE TRY TO KEEP YOU FROM HAVING/CONTACTING OTHER FRIENDS OR DOING THINGS OUTSIDE YOUR HOME?: NO

## 2024-08-23 SDOH — ECONOMIC STABILITY: FOOD INSECURITY: WITHIN THE PAST 12 MONTHS, YOU WORRIED THAT YOUR FOOD WOULD RUN OUT BEFORE YOU GOT MONEY TO BUY MORE.: NEVER TRUE

## 2024-08-23 SDOH — ECONOMIC STABILITY: TRANSPORTATION INSECURITY
IN THE PAST 12 MONTHS, HAS LACK OF TRANSPORTATION KEPT YOU FROM MEETINGS, WORK, OR FROM GETTING THINGS NEEDED FOR DAILY LIVING?: NO

## 2024-08-23 SDOH — HEALTH STABILITY: MENTAL HEALTH: CURRENT SMOKER: 0

## 2024-08-23 SDOH — SOCIAL STABILITY: SOCIAL INSECURITY: HAVE YOU HAD ANY THOUGHTS OF HARMING ANYONE ELSE?: NO

## 2024-08-23 SDOH — ECONOMIC STABILITY: HOUSING INSECURITY: DO YOU FEEL UNSAFE GOING BACK TO THE PLACE WHERE YOU ARE LIVING?: NO

## 2024-08-23 SDOH — SOCIAL STABILITY: SOCIAL INSECURITY: HAS ANYONE EVER THREATENED TO HURT YOUR FAMILY OR YOUR PETS?: NO

## 2024-08-23 SDOH — HEALTH STABILITY: MENTAL HEALTH: NON-SPECIFIC ACTIVE SUICIDAL THOUGHTS (PAST 1 MONTH): NO

## 2024-08-23 SDOH — ECONOMIC STABILITY: TRANSPORTATION INSECURITY
IN THE PAST 12 MONTHS, HAS THE LACK OF TRANSPORTATION KEPT YOU FROM MEDICAL APPOINTMENTS OR FROM GETTING MEDICATIONS?: NO

## 2024-08-23 SDOH — SOCIAL STABILITY: SOCIAL INSECURITY: PHYSICAL ABUSE: DENIES

## 2024-08-23 SDOH — SOCIAL STABILITY: SOCIAL INSECURITY: HAVE YOU HAD THOUGHTS OF HARMING ANYONE ELSE?: NO

## 2024-08-23 SDOH — ECONOMIC STABILITY: INCOME INSECURITY: HOW HARD IS IT FOR YOU TO PAY FOR THE VERY BASICS LIKE FOOD, HOUSING, MEDICAL CARE, AND HEATING?: NOT HARD AT ALL

## 2024-08-23 ASSESSMENT — PAIN SCALES - GENERAL
PAINLEVEL_OUTOF10: 0 - NO PAIN
PAINLEVEL_OUTOF10: 5 - MODERATE PAIN
PAINLEVEL_OUTOF10: 0 - NO PAIN
PAIN_LEVEL: 2

## 2024-08-23 ASSESSMENT — LIFESTYLE VARIABLES
AUDIT-C TOTAL SCORE: 0
AUDIT-C TOTAL SCORE: 0
HOW OFTEN DO YOU HAVE 6 OR MORE DRINKS ON ONE OCCASION: NEVER
HOW MANY STANDARD DRINKS CONTAINING ALCOHOL DO YOU HAVE ON A TYPICAL DAY: PATIENT DOES NOT DRINK
SKIP TO QUESTIONS 9-10: 1
HOW OFTEN DO YOU HAVE A DRINK CONTAINING ALCOHOL: NEVER

## 2024-08-23 ASSESSMENT — PATIENT HEALTH QUESTIONNAIRE - PHQ9
2. FEELING DOWN, DEPRESSED OR HOPELESS: NOT AT ALL
SUM OF ALL RESPONSES TO PHQ9 QUESTIONS 1 & 2: 0
1. LITTLE INTEREST OR PLEASURE IN DOING THINGS: NOT AT ALL

## 2024-08-23 ASSESSMENT — ACTIVITIES OF DAILY LIVING (ADL)
DRESSING YOURSELF: INDEPENDENT
WALKS IN HOME: INDEPENDENT
PATIENT'S MEMORY ADEQUATE TO SAFELY COMPLETE DAILY ACTIVITIES?: YES
ADEQUATE_TO_COMPLETE_ADL: YES
GROOMING: INDEPENDENT
LACK_OF_TRANSPORTATION: NO
FEEDING YOURSELF: INDEPENDENT
TOILETING: INDEPENDENT
HEARING - LEFT EAR: FUNCTIONAL
BATHING: INDEPENDENT
HEARING - RIGHT EAR: FUNCTIONAL
JUDGMENT_ADEQUATE_SAFELY_COMPLETE_DAILY_ACTIVITIES: YES

## 2024-08-23 NOTE — ANESTHESIA PROCEDURE NOTES
Spinal Block    Patient location during procedure: OB  Start time: 8/23/2024 2:16 PM  End time: 8/23/2024 2:20 PM  Reason for block: primary anesthetic  Staffing  Performed: CRNA   Authorized by: KERI Hope    Performed by: KERI Hope    Preanesthetic Checklist  Completed: patient identified, IV checked, risks and benefits discussed, surgical consent, monitors and equipment checked, pre-op evaluation, timeout performed and sterile techniques followed  Block Timeout  RN/Licensed healthcare professional reads aloud to the Anesthesia provider and entire team: Patient identity, procedure with side and site, patient position, and as applicable the availability of implants/special equipment/special requirements.  Patient on coagulant treatment: no  Timeout performed at: 8/23/2024 2:16 PM  Spinal Block  Patient position: sitting  Prep: ChloraPrep  Sterility prep: cap, gloves, hand hygiene, mask and drape  Sedation level: no sedation  Patient monitoring: blood pressure, continuous pulse oximetry, EKG and heart rate  Approach: midline  Vertebral space: L3-4  Injection technique: single-shot  Needle  Needle type: pencil-point   Needle gauge: 24 G  Needle length: 10.2 cm  Free flowing CSF: yes    Assessment  Sensory level: T4 bilateral  Block outcome: Allis test negative  Procedure assessment: patient tolerated procedure well with no immediate complications

## 2024-08-23 NOTE — L&D DELIVERY NOTE
OB Delivery Note  2024  Shannon Wright  34 y.o.   , Low Transverse and bilateral salpingectomy.      Gestational Age: 39w0d  /Para:   Quantitative Blood Loss: Admission to Discharge: 320 mL (2024 12:09 PM - 2024  4:47 PM)    Madhav Wright [[id number]]      Labor Events    Rupture type: Artificial  Fluid color: Clear  Labor type:  Without Labor  Labor allowed to proceed with plans for an attempted vaginal birth?: No  Complications: None       Labor Length    3rd stage: 0h 01m       Placenta    Placenta delivery date/time: 2024 1445  Placenta removal: Expressed  Placenta appearance: Intact  Placenta disposition: discarded       Cord    Vessels: 3 vessels  Complications: None  Delayed cord clamping?: Yes  Cord blood disposition: Lab  Gases sent?: No  Stem cell collection (by provider): No       Lacerations    Episiotomy: None  Perineal laceration: None  Other lacerations?: No       Anesthesia    Method: Spinal       Operative Delivery    Forceps attempted?: No  Vacuum extractor attempted?: No       Shoulder Dystocia    Shoulder dystocia present?: No        Delivery    Birth date/time: 2024 14:44:00  Delivery type: , Low Transverse   categorization: repeat   priority: routine  Indications for : Repeat   Incision type: low transverse  Complications: None       Resuscitation    Method: Suctioning       Apgars    Living status: Living  Apgar Component Scores:  1 min.:  5 min.:  10 min.:  15 min.:  20 min.:    Skin color:  1  1       Heart rate:  2  2       Reflex irritability:  2  2       Muscle tone:  2  2       Respiratory effort:  2  2       Total:  9  9       Apgars assigned by: EMILEE SÁNCHEZ RN       Delivery Providers    Delivering clinician: Yulisa Sheth MD   Provider Role    Mary Kate Bogsg RN Delivery Nurse    Octavia Sánchez, RN Nursery Nurse     Resident    Janee Miller MD Surgical Assistant                Due to the complexity of the surgical case an assistant surgeon was necessary to complete the case.  During the case  participated though out the entirety of case.    The patient was taken to the operative room where anesthesia was found to be adequate. She was placed in the dorsal supine position with a leftward tilt. SCDs were placed. De Los Santos catheter was placed. Vaginal prep was performed. A preoperative dose of antibiotics were given. Incision was made with a scalpel and carried down to the level of the fascia. The fascia was incised and extended laterally. Two Kocher clamps were placed on the superior aspect of the fascia and the underlying rectus muscles were dissected off the fascia using a combination of blunt and sharp dissection. Similarly, two Kocher clamps were placed on the inferior aspect of the fascia and the underlying rectus muscles were dissected off the fascia using blunt dissection. There was significant fascial scarring and the anatomic planes were not easily defined. The rectus muscles were  in the midline and the peritoneal cavity was entered. Gentle lateral traction was used to extend the peritoneal opening superiorly and inferiorly. It was noted that the bladder flap was adhesed high to the upper anterior aspect of the uterus. The filmy bladder peritoneum tissue was taken down with blunt and sharp dissection with care to be well away from the bladder. A bladder flap was created and the bladder blade was inserted to protect the bladder from the operative field. A lower transverse uterine incision was made with the scalpel and extended laterally with gentle superior and inferior traction. The fetal head was lifted to the level of the hysterotomy with special attention paid to avoid using the uterine incision as a fulcrum. Terminal meconium noted at delivery.  The infant was delivered atraumatically in the standard fashion. The cord was clamped and cut. The infant  who was vigorously crying, was handed off to the awaiting nurses. Cord blood was collected. The placenta was then expressed. The uterus was exteriorized and a laparotomy sponge used the clear the uterus and ensure complete removal of placental membranes. The hysterotomy was closed in a running locked fashion with 1-0 chromic suture. Second linear imbricating suture #1 chromic. The hysterotomy was inspected and good hemostasis was noted. The uterus, ovaries and fallopian tubes were noted to be normal appearing. The uterus was returned to the abdominal cavity. Two moist laparotomy sponges were used to clean the abdominal cavity of all clots and debris. The hysterotomy was again reinspected and good hemostasis was noted. There were some areas of oozing where the adhesions of the bladder flap to the anterior wall of the uterus had been taken down. Hemostasis was obtained with electrocautery and Merari was applied. All tissue planes were inspected for hemostasis and achieved where needed with Bovie cauterization. We then proceeded with the salpingectomy. The ligasure device was used to seal and cut across the mesosalpinx. Both fallopian tubes were excised and hemostasis obtained. The rectus muscles were reapproximated in the midline using interrupted sutures of 0-Vicryl. Fascia was closed in a running fashion with 0-vicryl suture starting at both corners and meeting in the midline. The subcutaneous layer was irrigated and reapproximated with 3.0 plain with interrupted stitches. The skin was close in a subcuticular fashion with 4-0 Vicryl. Steristrips applied to the incision. A dry sterile dressing was applied to the incision. The patient tolerated the procedure well. All counts were corrected per nursing staff. The patient was returned to the recovery room in stable condition.      Yulisa Sheth MD

## 2024-08-23 NOTE — CARE PLAN
Problem: Postpartum  Goal: Experiences normal postpartum course  Outcome: Progressing  Goal: Appropriate maternal -  bonding  Outcome: Progressing  Goal: Establish and maintain infant feeding pattern for adequate nutrition  Outcome: Progressing  Goal: Incisions, wounds, or drain sites healing without S/S of infection  Outcome: Progressing  Goal: No s/sx of hemorrhage  Outcome: Progressing     Problem:  Recovery Care  Goal: Verbalizes understanding of post-op instructions  Outcome: Progressing  Goal: Manages discomfort  Outcome: Progressing  Goal: Dressing intact until removed with any drainage marked  Outcome: Progressing  Goal: Patient vital signs are stable  Outcome: Progressing  Goal: Urine output is 0.5 mL/kg/hr or more  Outcome: Progressing  Goal: Fundus firm at midline  Outcome: Progressing   The patient's goals for the shift include safe labor and delivery    The clinical goals for the shift include rest; feed     Over the shift, the patient did not make progress toward the following goals. Barriers to progression include none. Recommendations to address these barriers include none.

## 2024-08-23 NOTE — ANESTHESIA PREPROCEDURE EVALUATION
Patient: Shannon Wright    Evaluation Method: In-person visit    Procedure Information       Date: 24    Procedure: OBGYN Delivery  Section    Location: [facility]    Surgeons: Yulisa Sheth MD            Relevant Problems   Cardiac  H/o POTS syndrome      GYN   (+) 39 weeks gestation of pregnancy ([facility])   (+) Multigravida of advanced maternal age in third trimester ([facility])   (+) Supervision of other normal pregnancy, antepartum ([facility])       Clinical information reviewed:    Allergies  Meds  Problems              NPO Detail:  No data recorded     OB/Gyn Evaluation    Present Pregnancy    (+) , previous  section   Obstetric History    (+)  section              Physical Exam    Airway  Mallampati: II     Cardiovascular   Rhythm: regular     Dental    Pulmonary    Abdominal            Anesthesia Plan    History of general anesthesia?: yes  History of complications of general anesthesia?: no    ASA 2     The patient is not a current smoker.    Anesthetic plan and risks discussed with patient.

## 2024-08-23 NOTE — H&P
Obstetrical Admission History and Physical     Shannon Wright is a 34 y.o.  at 39w0d. EVIE: 2024, by Last Menstrual Period. Estimated fetal weight: 3250 gm. She has had prenatal care with [facility] .    Pregnancy significant for:  Prior c/s x 2  H/o Sjogrens, SS-A antibody positive; fetal echos normal  POTS  AMA - had rrNIPT  Desires permanent sterilization    Chief Complaint: Scheduled     Assessment & Plan  39 weeks gestation of pregnancy ([facility])    Uterine scar from previous  delivery    Sterilization    Admit to L&D  Planning RLTCS delivery and bilateral salpingectomy as patient desires permanent sterilization  Continuous fetal monitoring  Place IV, start IV Fluids  CBC, T&S ordered  2 grams Ancef prior to the OR    Pregnancy Problems (from 24 to present)       Problem Noted Resolved    39 weeks gestation of pregnancy ([facility]) 2024 by Janee Miller MD No    Priority:  Medium      Multigravida of advanced maternal age in third trimester ([facility]) 2024 by Janee Miller MD No    Priority:  Medium      Supervision of other normal pregnancy, antepartum ([facility]) 2024 by Janee Miller MD No    Priority:  Medium            Options for delivery have been discussed with the patient and she elects for a repeat  section. The surgery has been discussed in detail. The risks, benefits, complications, alternatives, expected outcomes, potential problems during recuperation and recovery, and the risks of not performing the procedure were discussed with the patient. The patient stated understanding that the risks of a  section include, but are not limited to: death; reaction to medications; injury to bowel, bladder, ureters, uterus, cervix, vagina, and other pelvic and abdominal structures, infection; blood loss and possible need for transfusion; and potential need for more surgery, including hysterectomy. The risks of injury to the infant during   Section were also discussed. The possible need for a  Section in the future was explained. All questions were answered. There was concurrence with the planned procedure, and the patient wanted to proceed.    Admit to inpatient status. I anticipate that this patient will require a stay exceeding at least 2 midnights for delivery and postpartum.  Proceed with the planned repeat  section.  Management of pregnancy complications, as indicated.    Subjective   Good fetal movement. Denies vaginal bleeding., Denies contractions., Denies leaking of fluid.     Her previous pregnancy(ies) have been delivered by  section(s) and after considering options for delivery of this pregnancy, she has elected for a repeat  section     Obstetrical History   OB History    Para Term  AB Living   3 2 2 0 0 2   SAB IAB Ectopic Multiple Live Births   0 0 0 0 2      # Outcome Date GA Lbr John/2nd Weight Sex Type Anes PTL Lv   3 Current            2 Term 10/07/21 38w6d  3.58 kg M CS-LTranv Spinal N RACHEL   1 Term 19 39w1d  3.23 kg F CS-LTranv EPI N RACHEL       Past Medical History  Past Medical History:   Diagnosis Date    POTS (postural orthostatic tachycardia syndrome)     Rheumatoid arthritis (Multi) +sjogrens, POTS / autoimmune disorders    Sjogren's disease (Multi)         Past Surgical History   Past Surgical History:   Procedure Laterality Date     SECTION, LOW TRANSVERSE  3/21/19 & 10/07/2021    TONSILLECTOMY      WISDOM TOOTH EXTRACTION         Social History  Social History     Tobacco Use    Smoking status: Never    Smokeless tobacco: Never   Substance Use Topics    Alcohol use: Not Currently     Substance and Sexual Activity   Drug Use Never       Allergies  Patient has no known allergies.     Medications  Medications Prior to Admission   Medication Sig Dispense Refill Last Dose    aspirin 81 mg EC tablet Take 2 tablets (162 mg) by mouth once daily.       CALCIUM 26-VIT  D3-MAGNESIUM 15 ORAL Take by mouth.       cholecalciferol (Vitamin D-3) 50 mcg (2,000 unit) capsule Take by mouth.       cyanocobalamin (Vitamin B-12) 250 mcg tablet Take 1 tablet (250 mcg) by mouth once daily.       folic acid (Folvite) 1 mg tablet Take by mouth once daily.       prenatal no115/iron/folic acid (PRENATAL 19 ORAL) Take by mouth.          Objective    Last Vitals  Temp Pulse Resp BP MAP O2 Sat                   Physical Examination  GENERAL: Examination reveals a well developed, well nourished, gravid female in no acute distress. She is alert and cooperative.  LUNGS: clear to auscultation bilaterally  HEART: regular rate and rhythm, S1, S2 normal, no murmur, click, rub or gallop  ABDOMEN: soft, gravid, nontender, nondistended, no abnormal masses, no epigastric pain  FHR is Category 1   [place] reading:  no contractions  CERVIX:   exam deferred; MEMBRANES are intact    PSYCHOLOGICAL: awake and alert; oriented to person, place, and time    Lab Review  Labs in chart were reviewed.

## 2024-08-24 LAB
ERYTHROCYTE [DISTWIDTH] IN BLOOD BY AUTOMATED COUNT: 14 % (ref 11.5–14.5)
HCT VFR BLD AUTO: 36.7 % (ref 36–46)
HGB BLD-MCNC: 12.2 G/DL (ref 12–16)
MCH RBC QN AUTO: 30.9 PG (ref 26–34)
MCHC RBC AUTO-ENTMCNC: 33.2 G/DL (ref 32–36)
MCV RBC AUTO: 93 FL (ref 80–100)
NRBC BLD-RTO: 0 /100 WBCS (ref 0–0)
PLATELET # BLD AUTO: 175 X10*3/UL (ref 150–450)
RBC # BLD AUTO: 3.95 X10*6/UL (ref 4–5.2)
WBC # BLD AUTO: 12.7 X10*3/UL (ref 4.4–11.3)

## 2024-08-24 PROCEDURE — 2500000004 HC RX 250 GENERAL PHARMACY W/ HCPCS (ALT 636 FOR OP/ED): Performed by: OBSTETRICS & GYNECOLOGY

## 2024-08-24 PROCEDURE — 85027 COMPLETE CBC AUTOMATED: CPT | Performed by: OBSTETRICS & GYNECOLOGY

## 2024-08-24 PROCEDURE — 36415 COLL VENOUS BLD VENIPUNCTURE: CPT | Performed by: OBSTETRICS & GYNECOLOGY

## 2024-08-24 PROCEDURE — 2500000001 HC RX 250 WO HCPCS SELF ADMINISTERED DRUGS (ALT 637 FOR MEDICARE OP): Performed by: OBSTETRICS & GYNECOLOGY

## 2024-08-24 PROCEDURE — 1120000001 HC OB PRIVATE ROOM DAILY

## 2024-08-24 ASSESSMENT — PAIN SCALES - GENERAL
PAINLEVEL_OUTOF10: 5 - MODERATE PAIN
PAINLEVEL_OUTOF10: 5 - MODERATE PAIN

## 2024-08-24 NOTE — LACTATION NOTE
This note was copied from a baby's chart.  Lactation Consultant Note     24 [id number]   Lactation Consultation   Reason for Consult Follow-up assessment   Consultant Name RONNY Bautista RN, IBCLC   Infant Assessment   Infant Behavior Fussy;Readiness to feed;Feeding cues observed;Rooting response;Crying   Feeding Assessment   Nutrition Source Breastmilk   Feeding Method Nursing at the breast   Feeding Position Breast sandwich;Football/seated;Skin to skin;Nipple to nose;Mother demonstrates good positioning   Suck/Feeding Sustained;Coordinated suck/swallow/breathe;Baby led rhythmically   Latch Assessment Minimal assistance is needed;Instructed on deep latch;Eagerly grasped on to latch;Cries while latching;Deep latch obtained;Latch achieved after repeated attempts;Optimal angle of mouth opening;Comfortable with no pain;Sucking and swallowing;Sucks with long jaw movement;Bursts of sucking, swallowing, and rest;Flanged lips;Chin moves in rhythmic motion;Comfortable latch   LATCH Tool   Latch 2   Audible Swallowing 1   Type of Nipple 2   Comfort (Breast/Nipple) 2   Hold (Positioning) 2   LATCH Score 9   Patient Follow-Up   Inpatient Lactation Follow-up Needed  Yes          Recommendations/Summary  LC to bedside to follow up with feeding progress and review education. Mother independently latching  to the breast. Mother states  began showing feeding cues again.  fussy at the breast. Father taking  to bassinet for diaper change before placing  back with mother. Mother states  already nursed at the left breast for 20 minutes in cradle hold. Mother now independently positioning  to the right breast in football hold. After several attempts, a deep latch was obtained. Deep latch observed with active sucking and swallowing, lips flanged and long jaw movements. Mother states latch is comfortable. LC reviewed allowing  to continue nursing at each breast until  appears  satiated and offering both breasts at each feed. Mother states understanding.    Education reviewed at this time. Reviewed milk production, normal  feeding patterns in the first 24 hours and 's stomach capacity. Reviewed feeding cues, waking techniques and signs to know  is eating enough. Reviewed signs of a deep and comfortable latch and adequate output. Reviewed frequency of feeds and importance of feeding  every 3 hours from the beginning of the previous feed or earlier with feeding cues. Discussed importance of skin to skin. Mother states understanding. [place] continuing to nurse at this time. Offered ongoing assistance with breastfeeding. Mother denies further questions or concerns at this time.

## 2024-08-24 NOTE — ANESTHESIA POSTPROCEDURE EVALUATION
Patient: Shannon Wright    Procedure Summary       Date: 24 Room / Location: [place] [address] OB    Anesthesia Start:  Anesthesia Stop:     Procedure: OBGYN Delivery  Section Diagnosis: (Prior Uterine Surgery)    Surgeons: Yulisa Sheth MD Responsible Provider: KERI Hope    Anesthesia Type: spinal ASA Status: 2            Anesthesia Type: spinal    Vitals Value Taken Time   /64 24 1739   Temp 36.6 °C (97.9 °F) 24 1536   Pulse 54 24 1739   Resp 16 24 1739   SpO2 100 % 24 1738       Anesthesia Post Evaluation    Patient location during evaluation: bedside  Patient participation: complete - patient participated  Level of consciousness: awake and alert  Pain score: 2  Pain management: satisfactory to patient  Airway patency: patent  Cardiovascular status: acceptable  Respiratory status: acceptable  Hydration status: acceptable  Postoperative Nausea and Vomiting: none        No notable events documented.

## 2024-08-24 NOTE — LACTATION NOTE
This note was copied from a baby's chart.  Lactation Consultant Note     08/23/24 [id number]   Lactation Consultation   Reason for Consult Initial assessment   Consultant Name RONNY Bautista RN, IBCLC   Maternal Information   Has mother  before? Yes   How long did the mother previously breastfeed? 6 months then pumped for several months with 6 y/o, 3 months then pumped until 14 months for now 2 y/o   Previous Maternal Breastfeeding Challenges Breast/nipple pain;Difficult latch   Infant to breast within first 2 hours of birth? Yes   Exclusive Pump and Bottle Feed No   Maternal Assessment   Breast Assessment Large;Symmetrical;Soft;Warm;Compressible   Nipple Assessment Intact;Erect;Short;Rounded after feeding   [place] Assessment Normal   Infant Assessment   Infant Behavior Fussy;Crying;Readiness to feed;Feeding cues observed;Rooting response   Infant Assessment   (39 weeks, approximately 1.5 HOL)   Feeding Assessment   Nutrition Source Breastmilk   Feeding Method Nursing at the breast   Feeding Position Breast sandwich;Cradle;Cross - cradle;Laid back;Skin to skin;Both sides;Nipple to nose;Mother demonstrates good positioning;Mother needs assistance with latch/positioning;Baby's head too high over breast   Suck/Feeding Sustained;Unsustained   Latch Assessment Moderate assistance is needed;Instructed on deep latch;Eagerly grasped on to latch;Cries while latching;Shallow latch;Deep latch obtained;Latch achieved after repeated attempts;Optimal angle of mouth opening;Comfortable with no pain;Sucking and swallowing;Sucks with long jaw movement;Flanged lips;Lower lip turned in;Chin moves in rhythmic motion;Comfortable latch;Nipple - slurping/pulls/nibbles;Maximum assistance is needed   LATCH Tool   Latch 1   Audible Swallowing 1   Type of Nipple 2   Comfort (Breast/Nipple) 2   Hold (Positioning) 1   LATCH Score 7   Breast Pump   Pump   (Mother states she will file for a new breast pump through her insurance.)   Patient  Follow-Up   Inpatient Lactation Follow-up Needed  Yes   Outpatient Lactation Follow-up Recommended   Other OB Lactation Documentation    Maternal Risk Factors  delivery   Infant Risk Factors Early term birth 37-39 weeks;High birth weight >3600 g       Recommendations/Summary  35 y/o  experienced breastfeeding mother with delivery of full term  boy approximately 1.5 hours ago via repeat . Mother's history notable for Sjrogrens. Mother states she plans to breastfeed this  for as long as possible and is hoping to make it to one year. Mother states she breast fed her first child, now 5, for 6 months before switching to pumping and pumped exclusively for several more months. Mother states she breast fed her second child, now 3, for 3 months with pain with latching before switching to exclusively pumping until  was 14 months old. Mother states she believes her second  never latched well secondary to underdeveloped respiratory system and breathing issues. Mother reports +breast changes during pregnancy and denies history of breast surgery. Mother states she has her prescription to file for a new breast pump.     LC to bedside to assist with first feed after delivery. Mother attempting to latch  to the left breast in laid back cradle hold. [place] latch but having difficulty sustaining the latch in this position. LC offered to assist. Mother requesting LC assistance as she states she feels dizzy if she sits up further and due to limited mobility from her . With mother's permission, LC positioned  to the left breast in cross cradle with breast shaping. Deep latch obtained but  unlatching intermittently and crying but re-latching easily. [place] needing continuous breast shaping to maintain latch. LC holding position but being called to another room. RN taking over holding  to the breast. LC to return to review further education and mother's  breastfeeding goals. Mother states understanding and very receptive.     1700 RN states she repositioned  to the breast in football hold and states this was more manageable for mother and  to maintain the latch.

## 2024-08-24 NOTE — LACTATION NOTE
This note was copied from a baby's chart.  Lactation Consultant Note     24 [id number]   Lactation Consultation   Reason for Consult Follow-up assessment   Consultant Name RONNY Bautista RN, IBCLC   Maternal Information   Has mother  before? Yes   How long did the mother previously breastfeed? 6 months (pumped for several additional months) and 3 months (pumped until  was 14 months).   Previous Maternal Breastfeeding Challenges Breast/nipple pain   Infant to breast within first 2 hours of birth? Yes   Exclusive Pump and Bottle Feed No   Maternal Assessment   Breast Assessment   ( did not assess at this time.)   Nipple Assessment Intact  (per mother)   Infant Assessment   Infant Behavior Light sleep  (swaddled, being held by father)   Infant Assessment   (39 weeks, approximately 21 HOL, 2 voids/3 stools since delivery, -3.29% weight loss @14 HOL)   Feeding Assessment   Nutrition Source Breastmilk   Feeding Method Nursing at the breast   Unable to assess infant feeding at this time   (Mother states  is due to feed within the next 30 minutes.)   Breast Pump   Pump   (Mother states she will be picking up her new breast pump after discharge.)   Patient Follow-Up   Inpatient Lactation Follow-up Needed  Yes  (as needed)   Outpatient Lactation Follow-up Recommended   Other OB Lactation Documentation    Maternal Risk Factors  delivery  (Sjogrens)   Infant Risk Factors High birth weight >3600 g          Recommendations/Summary  33 y/o  experienced breastfeeding mother with delivery of  boy approximately 21 hours ago via repeat  with BPS. Mother plans to breastfeed this  for one year. Mother states she breast fed her now 5 and 3 y/os for 6 and 3 months respectively before switching to exclusively pumping. Mother states she had a lot of pain with latching her second  but believes this was due to  having long term respiratory issues. Mother reports +breast  changes during pregnancy and denies history of breast surgery. Mother states she has been approved for a breast pump through her insurance and will be picking it up upon discharge.     LC to bedside to assess breastfeeding progress and review education. Mother states  has been nursing well, feeding approximately every 2-3 hours. Mother denies pain or breakdown with latching and states  has only cluster fed for a short period overnight. Mother denies feeling g[place]more today. Mother states  is due to feed in approximately 30 minutes. LC encouraged mother to call should she wish to have a feed assessed. Mother agreeable but states confidence with latching independently.     Education reviewed at this time. Reviewed milk production and normal  feeding patterns in the first 24-48 hours, specifically cluster feeding. Reviewed feeding cues and waking techniques. Reviewed signs of a deep and comfortable latch and adequate output. Reviewed frequency of feeds and importance of feeding  every 3 hours from the beginning of the previous feed or earlier with feeding cues. Mother states understanding. LC reviewed option of following up with outpatient lactation upon discharge. Mother receptive. Offered ongoing assistance with breastfeeding. Mother denies further questions or concerns at this time.

## 2024-08-24 NOTE — PROGRESS NOTES
Postpartum Progress Note    Assessment/Plan   Shannon Wright is a 34 y.o., , who delivered at 39w0d gestation and is now postoperative day 1.  S/p RLTCS and bilateral salpingectomy.    - Doing well overall this morning. No acute complications overnight.  - She is hemodynamically stable. H/H this morning   - Blood pressures are normal.  - Pain management with Tylenol and Motrin. Oxycodone for breakthrough pain if needed.  - Regular diet.  - Encourage ambulation. Simethicone for gas discomfort.  - SCDs and Lovenox for DVT prophylaxis.  - Lactation support as needed.     Principal Problem:    Status post repeat low transverse  section  Active Problems:    Sterilization        Subjective   Her pain is well controlled with current medications  She is not passing flatus yet  She is ambulating well and showered  She is tolerating a Adult diet Regular  She reports no breast or nursing problems  She denies emotional concerns today       Objective   Allergies:   Patient has no known allergies.    Last Vitals:  Temp Pulse Resp BP MAP Pulse Ox   36.6 °C (97.9 °F) 62 18 93/58   98 %     Vitals Min/Max Last 24 Hours:  Temp  Min: 36.6 °C (97.9 °F)  Max: 36.9 °C (98.4 °F)  Pulse  Min: 51  Max: 100  Resp  Min: 16  Max: 18  BP  Min: 93/58  Max: 126/63    Intake/Output:     Intake/Output Summary (Last 24 hours) at 2024 0750  Last data filed at 2024 1837  Gross per 24 hour   Intake 1200 ml   Output 645 ml   Net 555 ml       Physical Exam:  General: Examination reveals a well developed, well nourished, female, in no acute distress. She is alert and cooperative.  Lungs: Normal respiratory effort  Abdomen: soft, mild appropriate postop tenderness, moderate gas distention  Incision: Covered with Mepilex bandage; bandage is dry with a small area of old blood, no surrounding drainage or erythema.  Fundus: firm.  Extremities: trace edema noted, no erythema, normal movements.  Psychological: awake and alert;  oriented to person, place, and time.    Lab Data:  Labs in chart were reviewed.  Lab Results   Component Value Date    WBC 12.7 (H) 08/24/2024    HGB 12.2 08/24/2024    HCT 36.7 08/24/2024    MCV 93 08/24/2024     08/24/2024

## 2024-08-25 ENCOUNTER — LACTATION ENCOUNTER (OUTPATIENT)
Dept: NURSERY | Facility: HOSPITAL | Age: 34
End: 2024-08-25

## 2024-08-25 VITALS
HEIGHT: 63 IN | OXYGEN SATURATION: 98 % | DIASTOLIC BLOOD PRESSURE: 63 MMHG | BODY MASS INDEX: 34.55 KG/M2 | TEMPERATURE: 98.2 F | RESPIRATION RATE: 18 BRPM | SYSTOLIC BLOOD PRESSURE: 108 MMHG | WEIGHT: 195 LBS | HEART RATE: 72 BPM

## 2024-08-25 PROCEDURE — 2500000001 HC RX 250 WO HCPCS SELF ADMINISTERED DRUGS (ALT 637 FOR MEDICARE OP): Performed by: OBSTETRICS & GYNECOLOGY

## 2024-08-25 PROCEDURE — 2500000004 HC RX 250 GENERAL PHARMACY W/ HCPCS (ALT 636 FOR OP/ED): Performed by: OBSTETRICS & GYNECOLOGY

## 2024-08-25 RX ORDER — ACETAMINOPHEN 325 MG/1
975 TABLET ORAL EVERY 6 HOURS PRN
COMMUNITY
Start: 2024-08-25

## 2024-08-25 RX ORDER — IBUPROFEN 200 MG
TABLET ORAL
COMMUNITY
Start: 2024-08-25

## 2024-08-25 ASSESSMENT — PAIN SCALES - GENERAL
PAINLEVEL_OUTOF10: 7
PAINLEVEL_OUTOF10: 6

## 2024-08-25 NOTE — DISCHARGE SUMMARY
Discharge Summary    Admission Date: 2024  Discharge Date: 2024    Discharge Diagnosis  Status post repeat low transverse  section    Hospital Course  Delivery Date: 2024 2:44 PM  Delivery type: , Low Transverse   GA at delivery: 39w0d  Outcome: Living  Anesthesia during delivery: Spinal  Intrapartum complications: None  Feeding method: Breastfeeding Status: Unknown       The patient presented at 39 weeks for a scheduled repeat . She had a LTCS and bilateral salpingectomy without complication. There were no postpartum issues. She was discharged home postop day 2 in good condition.    Pertinent Physical Exam At Time of Discharge  General: Examination reveals a well developed, well nourished, female, in no acute distress. She is alert and cooperative.  Lungs: Normal respiratory effort  Abdomen: soft, mild appropriate postop tenderness, mild gas distention  Incision: Covered with Mepilex bandage; bandage is dry with a small area of old blood, no surrounding drainage or erythema.  Fundus: firm.  Extremities: trace edema noted, no erythema, normal movements.  Psychological: awake and alert; oriented to person, place, and time.    Last Vitals:  Temp Pulse Resp BP MAP Pulse Ox   36.8 °C (98.2 °F) 72 18 108/63 81 98 %     Discharge Meds     Your medication list        START taking these medications        Instructions Last Dose Given Next Dose Due   acetaminophen 325 mg tablet  Commonly known as: Tylenol      Take 3 tablets (975 mg) by mouth every 6 hours if needed for mild pain (1 - 3) or moderate pain (4 - 6).       ibuprofen 200 mg tablet      Take 3 tablets by mouth every 6 hours as needed for mild or moderate pain.              CONTINUE taking these medications        Instructions Last Dose Given Next Dose Due   CALCIUM 26-VIT D3-MAGNESIUM 15 ORAL           cholecalciferol 50 mcg (2,000 unit) capsule  Commonly known as: Vitamin D-3           cyanocobalamin 250 mcg  tablet  Commonly known as: Vitamin B-12           PRENATAL 19 ORAL                  STOP taking these medications      aspirin 81 mg EC tablet        folic acid 1 mg tablet  Commonly known as: Folvite                  Where to Get Your Medications        You can get these medications from any pharmacy    You don't need a prescription for these medications  acetaminophen 325 mg tablet  ibuprofen 200 mg tablet          Complications Requiring Follow-Up  2 week postop visit    Test Results Pending At Discharge  Pending Labs       Order Current Status    Surgical Pathology Exam Collected (08/23/24 4561)            Outpatient Follow-Up  Future Appointments   Date Time Provider Department Center   9/6/2024 11:00 AM Yulisa Sheth MD [id number] [facility]   10/3/2024 11:00 AM Yulisa Sheth MD [id number] [facility]       Janee Miller MD

## 2024-08-25 NOTE — PROGRESS NOTES
Postpartum Progress Note    Assessment/Plan   Shannon Wright is a 34 y.o., , who delivered at 39w0d gestation and is now postoperative day 2.  S/p RLTCS and bilateral salpingectomy.    - Doing well overall this morning.   - She is still hemodynamically stable.   - Blood pressures are normal.  - Pain was a little more yesterday, but good management with Tylenol and Motrin.   - Regular diet.  - Encouraging ambulation. Simethicone for gas discomfort.  - SCDs and Lovenox for DVT prophylaxis.  - Lactation support as needed.   - Ok for discharge home today.    Principal Problem:    Status post repeat low transverse  section  Active Problems:    Sterilization        Subjective   Her pain is well controlled with current medications this morning. Had some extra pain yesterday, but may have been mostly gas discomfort  She is now passing a little gas  She is ambulating well and showered  She is tolerating a Adult diet Regular  She reports no breast or nursing problems  She denies emotional concerns today       Objective   Allergies:   Patient has no known allergies.    Last Vitals:  Temp Pulse Resp BP MAP Pulse Ox   36.8 °C (98.2 °F) 72 18 108/63   98 %     Vitals Min/Max Last 24 Hours:  Temp  Min: 36.8 °C (98.2 °F)  Max: 36.8 °C (98.2 °F)  Pulse  Min: 62  Max: 75  Resp  Min: 18  Max: 18  BP  Min: 105/57  Max: 112/65    Intake/Output:   No intake or output data in the 24 hours ending 24 0944      Physical Exam:  General: Examination reveals a well developed, well nourished, female, in no acute distress. She is alert and cooperative.  Lungs: Normal respiratory effort  Abdomen: soft, mild appropriate postop tenderness, mild gas distention  Incision: Covered with Mepilex bandage; bandage is dry with a small area of old blood, no surrounding drainage or erythema.  Fundus: firm.  Extremities: trace edema noted, no erythema, normal movements.  Psychological: awake and alert; oriented to person, place, and  time.    Lab Data:  Labs in chart were reviewed.  Lab Results   Component Value Date    WBC 12.7 (H) 08/24/2024    HGB 12.2 08/24/2024    HCT 36.7 08/24/2024    MCV 93 08/24/2024     08/24/2024

## 2024-08-25 NOTE — LACTATION NOTE
This note was copied from a baby's chart.  Lactation Consultant Note  Lactation Consultation  Reason for Consult: Follow-up assessment (Discharge education)  Consultant Name: MEHDI Amezquita IBCLC    Maternal Information  Has mother  before?: Yes  How long did the mother previously breastfeed?: 3-6 months, pumped for up to 14 months  Previous Maternal Breastfeeding Challenges: Breast/nipple pain, Difficult latch  Infant to breast within first 2 hours of birth?: Yes  Exclusive Pump and Bottle Feed: No  WIC Program: No    Maternal Assessment  Breast Assessment: Medium, Symmetrical, Soft, Compressible  Nipple Assessment: Creased after feeding, Blistered (small blister noted at the tip of right nipple)  Alterations in Nipple Condition: Stage II - abrasions, shallow crack/fissure, stripe  Areola Assessment: Normal    Infant Assessment  Infant Behavior: Fussy (intermittently, just returned from circ, consoled with holding/nursing)  Infant Assessment:  (full term infant, born via repeat C/S, DOL 2, weight loss 7.3%)    Feeding Assessment  Nutrition Source: Breastmilk  Feeding Method: Nursing at the breast  Feeding Position: C - hold, Breast sandwich, Football/seated, Baby's head too high over breast (encouraged nipple to nose positioning)  Suck/Feeding: Sustained, Coordinated suck/swallow/breathe, Baby led rhythmically, Audible swallowing  Latch Assessment: Minimal assistance is needed, Instructed on deep latch, Eagerly grasped on to latch, Deep latch obtained, Optimal angle of mouth opening, Sucking and swallowing, Sucks with long jaw movement, Chin and lower lip contact breast first, Flanged lips, Chin moves in rhythmic motion, Frequent audible swallows (deeper, more comfortable latch acheived)    LATCH TOOL  Latch: Grasps breast, tongue down, lips flanged, rhythmic sucking  Audible Swallowing: Spontaneous and intermittent (24 hours old)  Type of Nipple: Everted (After stimulation)  Comfort (Breast/Nipple): Filling,  red/small blisters/bruises, mild/moderate discomfort  Hold (Positioning): Minimal assist, teach one side, mother does other, staff holds  LATCH Score: 8    Breast Pump  Pump:  (has a personal breast pump for home use)    Other OB Lactation Tools  Lactation Tools: Comfort gels (provided with instructions for use)    Patient Follow-up  Inpatient Lactation Follow-up Needed : No  Outpatient Lactation Follow-up: Recommended (provided with phone number to schedule as needed)  Lactation Professional - OK to Discharge: Yes    Other OB Lactation Documentation  Maternal Risk Factors:  delivery    Recommendations/Summary  34 year old  experienced breastfeeding mother and infant preparing for discharge. Met with parents for lactation consult to assess breastfeeding progress, to address any questions and/or concerns and to offer lactation assistance, support and education as needed and desired prior to discharge. Mother reports having a small blister on right nipple. Has been experiencing pain with feedings. Infant noted to be positioned high above breast. Assisted with optimal positioning techniques. Reviewed deep latch education-positioning infant's body in alignment, nipple to nose positioning, ensuring infant opens mouth wide enough, shaping breast far back behind areola, aiming nipple for roof of infant's mouth and bringing infant to the breast quickly and closely, ensuring adequate amount of breast tissue is in infant's mouth. Mother return demonstrated immediately and well. Reports significant improvement in comfort. Father educated on ways he can assist with this optimal positioning and latch.     Breastfeeding written and verbal discharge education reviewed throughout consult. Parents provided with the opportunity to ask questions. All questions answered. See education flow sheet for detailed list of education topics covered. Reviewed importance of frequent skin to skin contact, waking techniques, infant  stomach capacity, value of breast milk feeds as well as typical  feeding patterns and behaviors in the first few weeks of life. Encouraged frequent skin to skin and nursing with cues and at least 8-12 times or more per 24 hours. Reviewed signs of adequate intake/output. Reviewed resources for lactation follow-up and support after discharge. Parents deny any further questions or concerns at this time. Verbalize confidence with breastfeeding prior to discharge. Offered ongoing breastfeeding assistance, support and education as needed and desired.

## 2024-08-25 NOTE — DISCHARGE INSTRUCTIONS
"  [facility]  [address]  [address], [place] [address]  [address], [place] [address]  Telephone: [phone] [place] or [phone] [place]    After Discharge Orders:  Future Appointments   Date Time Provider Department [place]   2024 11:00 AM Yulisa Sheth MD [id number] [facility]   10/3/2024 11:00 AM Yulisa Sheth MD [id number]          Call the Physician with any  signs and symptoms:    Warning signs regarding incision:  \"Popping\" of stitches or staples  Foul smelling discharge or pus  More redness or streaks around incision than before    Incision care:  Keep incision covered with outer bandage for 1 week  Remove outer bandage 1 week after surgery, sooner if soggy  No tub baths until OK'd by your Physician or Midwife  You may use warm compresses on incision site     After your delivery - signs and symptoms to watch for:  Fever - Oral temperature greater than 100.4 degrees Fahrenheit  Foul-smelling vaginal discharge  Headache unrelieved by \"pain meds\"  Difficulty urinating  Breasts reddened, hard, hot to the touch  Nipple discharge which is foul-smelling or contains pus  Increased pain at the site of the surgical incision  Difficulty breathing with or without chest pain  New calf pain especially if only on one side  Sudden, continuing increased vaginal bleeding with or without clots  Unrelieved feelings of:  Inability to cope  Sadness  Anxiety  Lack of interest in baby  Insomnia  Crying     What to do at home:  See patient education handouts for full information  Resume activity gradually   Don't lift anything heavier than baby and carrier until OK'd by your Physician  No sex until OK'd by your Physician or Midwife  Take care of yourself by sleeping/resting as much as possible  Eat regular nutritious meals  Let someone else care for you, your baby, and housework as much as possible   Take pain medication as prescribed whenever you need them  Wear " compression stockings if prescribed   To avoid/relieve constipation take stool softeners if advised   Drink lots of water/fruit juices  Increase fiber in your diet  Breast care: Wear support bra ; use lanolin ointment/cream, cabbage leaves, nipple shields, or cool compresses as needed     Refer to [facility] Discharge Instructions for problems or follow-up regarding  nursing

## 2024-09-04 LAB
LABORATORY COMMENT REPORT: NORMAL
PATH REPORT.FINAL DX SPEC: NORMAL
PATH REPORT.GROSS SPEC: NORMAL
PATH REPORT.RELEVANT HX SPEC: NORMAL
PATH REPORT.TOTAL CANCER: NORMAL

## 2024-09-06 ENCOUNTER — APPOINTMENT (OUTPATIENT)
Dept: OBSTETRICS AND GYNECOLOGY | Facility: CLINIC | Age: 34
End: 2024-09-06
Payer: COMMERCIAL

## 2024-09-06 VITALS — SYSTOLIC BLOOD PRESSURE: 128 MMHG | WEIGHT: 176.8 LBS | DIASTOLIC BLOOD PRESSURE: 80 MMHG | BODY MASS INDEX: 31.32 KG/M2

## 2024-09-06 DIAGNOSIS — Z48.89 ENCOUNTER FOR POSTOPERATIVE CARE: Primary | ICD-10-CM

## 2024-09-06 PROCEDURE — 1036F TOBACCO NON-USER: CPT | Performed by: OBSTETRICS & GYNECOLOGY

## 2024-09-06 PROCEDURE — 99024 POSTOP FOLLOW-UP VISIT: CPT | Performed by: OBSTETRICS & GYNECOLOGY

## 2024-09-06 NOTE — PATIENT INSTRUCTIONS
Postpartum Visit- 2 week follow up:  You were seen for a routine postpartum visit with normal findings on exam  Continue routine postpartum precautions at home  Your incision is healing well and all bandages were removed.   You may return to driving, light activity, and light to moderate lifting (nothing greater than 50 lbs).  Continue pelvic rest until the next visit. This means abstain from intercourse and submersion in pools, hot tubs, bath tubs, even in your own home.  Please call the office with any excessively heavy bleeding, foul smelling vaginal discharge, redness/swelling at the incision site, worsening or new pain. [phone] ([place]) or [phone] ([place])

## 2024-09-06 NOTE — PROGRESS NOTES
ASSESSMENT/PLAN  Post op check s/p RLTCS and bilateral salpingectomy.   Return to office for 6 week postpartum check up.    SUBJECTIVE    HPI    35 yo for post op check s/p repeat LTCS and bilateral salpingectomy. Feels well, no major pain and recovery was better this time than two previous deliveries.  Breastfeeding going well.  Bleeding tapering.    OBJECTIVE    /80   Wt 80.2 kg (176 lb 12.8 oz)   LMP 11/24/2023 (Exact Date)   Breastfeeding Yes   BMI 31.32 kg/m²     Physical Exam     Constitutional: Alert and in no acute distress. Well developed, well nourished   Abdomen: soft nontender.  Incision: steristrips removed. Clean, dry, well approximated.  Psychiatric: alert and oriented x 3., affect normal to patient baseline and mood: appropriate       Yulisa Sheth MD

## 2024-09-18 ENCOUNTER — TELEPHONE (OUTPATIENT)
Dept: OBSTETRICS AND GYNECOLOGY | Facility: CLINIC | Age: 34
End: 2024-09-18
Payer: COMMERCIAL

## 2024-09-19 ENCOUNTER — POSTPARTUM VISIT (OUTPATIENT)
Dept: OBSTETRICS AND GYNECOLOGY | Facility: CLINIC | Age: 34
End: 2024-09-19
Payer: COMMERCIAL

## 2024-09-19 VITALS — BODY MASS INDEX: 31.18 KG/M2 | DIASTOLIC BLOOD PRESSURE: 70 MMHG | SYSTOLIC BLOOD PRESSURE: 112 MMHG | WEIGHT: 176 LBS

## 2024-09-19 DIAGNOSIS — B37.2 CANDIDAL SKIN INFECTION: ICD-10-CM

## 2024-09-19 DIAGNOSIS — T81.41XA SUPERFICIAL INCISIONAL SURGICAL SITE INFECTION: Primary | ICD-10-CM

## 2024-09-19 RX ORDER — NYSTATIN 100000 U/G
CREAM TOPICAL 2 TIMES DAILY
Qty: 30 G | Refills: 1 | Status: SHIPPED | OUTPATIENT
Start: 2024-09-19 | End: 2025-09-19

## 2024-09-19 NOTE — PROGRESS NOTES
Incision check    Assessment/Plan:  Candida of incision  Incisional care reviewed  Nystatin cream  RTO 2 weeks for postpartum exam.    Subjective    33 yo for incision check s/p repeat LTCS and bilateral salpingectomy 8-. Last visit for incision check 9/6/2024. Notes that there is some redness of the incision, slight drainage. No abd pain, no fevers.   Baby doing well. Pt otherwise feeling well.       Physical Exam:  /70   Wt 79.8 kg (176 lb)   LMP 11/24/2023 (Exact Date)   Breastfeeding Yes   BMI 31.18 kg/m²    Appears well in NAD, alert, oriented.  Abdomen: Soft, nontender  Incisions: clean, dry. Midline is a 2 mm separation in incision with surrounding 5 mm erythema, satellite lesions consistent with mindy.     Yulisa Sheth MD

## 2024-09-24 ENCOUNTER — TELEPHONE (OUTPATIENT)
Dept: OBSTETRICS AND GYNECOLOGY | Facility: CLINIC | Age: 34
End: 2024-09-24

## 2024-09-24 ENCOUNTER — APPOINTMENT (OUTPATIENT)
Dept: OBSTETRICS AND GYNECOLOGY | Facility: CLINIC | Age: 34
End: 2024-09-24
Payer: COMMERCIAL

## 2024-09-24 NOTE — TELEPHONE ENCOUNTER
TC with pt.   Seen last week. Rx nystatin cream.  Incision still red, slight drainage.  Office visit if possible today, if not in next few days as long as sx do not worsen.

## 2024-10-02 ENCOUNTER — APPOINTMENT (OUTPATIENT)
Dept: OBSTETRICS AND GYNECOLOGY | Facility: CLINIC | Age: 34
End: 2024-10-02
Payer: COMMERCIAL

## 2024-10-02 VITALS
HEIGHT: 63 IN | BODY MASS INDEX: 30.9 KG/M2 | SYSTOLIC BLOOD PRESSURE: 100 MMHG | WEIGHT: 174.4 LBS | DIASTOLIC BLOOD PRESSURE: 60 MMHG

## 2024-10-02 ASSESSMENT — EDINBURGH POSTNATAL DEPRESSION SCALE (EPDS)
THINGS HAVE BEEN GETTING ON TOP OF ME: NO, MOST OF THE TIME I HAVE COPED QUITE WELL
THE THOUGHT OF HARMING MYSELF HAS OCCURRED TO ME: NEVER
I HAVE FELT SAD OR MISERABLE: NO, NOT AT ALL
I HAVE LOOKED FORWARD WITH ENJOYMENT TO THINGS: AS MUCH AS I EVER DID
I HAVE BEEN SO UNHAPPY THAT I HAVE BEEN CRYING: NO, NEVER
I HAVE BLAMED MYSELF UNNECESSARILY WHEN THINGS WENT WRONG: NO, NEVER
I HAVE BEEN ANXIOUS OR WORRIED FOR NO GOOD REASON: NO, NOT AT ALL
I HAVE FELT SCARED OR PANICKY FOR NO GOOD REASON: NO, NOT MUCH
I HAVE BEEN SO UNHAPPY THAT I HAVE HAD DIFFICULTY SLEEPING: NOT AT ALL
I HAVE BEEN ABLE TO LAUGH AND SEE THE FUNNY SIDE OF THINGS: AS MUCH AS I ALWAYS COULD
TOTAL SCORE: 2

## 2024-10-02 NOTE — PATIENT INSTRUCTIONS
Postpartum Visit:  You were seen in office today for your postpartum visit with normal findings  At this time you have fully healed from delivery and are able to resume all activities including sexual intercourse  If you are nursing you may not have resumption of periods until you are done nursing. If you are formula feeding your periods should resume in the next few months.  If you are nursing, continue to monitor for signs of mastitis: fever, chills, flu like symptoms, swelling and tenderness of the breast not relieved by nursing/pumping, redness/warmth of the breast. Make sure you keep the breast empty by nursing or pumping. The milk is not infected and can be given to the infant.  You may resume intercourse at this time. You should use condoms until you have effective contraception in place.   Your depression assessment was notable for low risk for postpartum depression, continue to monitor for symptoms of depression and call the office if you are struggling  You were not due for a pap smear at this time, you should be seen in the office in 1 year for an annual gynecologic visit.   If you are having any problems in the next year, please call the office to let us know. [phone] ([place]) or [phone] ([place])

## 2024-10-02 NOTE — PROGRESS NOTES
"Postpartum Visit    IMP/PLAN  Normal postpartum exam  May resume all previous activities.   Incision should heal now that remaining suture was removed. Allow it to be dry, no longer need to apply nystatin cream.   Okay to cleanse with H2O2. Every few days if discharge.       Subjective   Shannon Wright is in for her postpartum visit.   She delivered repeat c/s and bilateral salpingectomy  She is generally doing well, denies current pain or bleeding issues, and has no significant depression issues.   She is breast feeding. No breast concerns.  Bleeding stopped, no period yet.   No bowel or bladder problems.    Objective   /60   Ht 1.6 m (5' 3\")   Wt 79.1 kg (174 lb 6.4 oz)   LMP 11/24/2023 (Exact Date)   Breastfeeding Yes   BMI 30.89 kg/m²   Body mass index is 30.89 kg/m².    2/6/2024 pap normal, HPV negative.     Physical Exam:     Constitutional: Alert and in no acute distress.     Pulmonary: No respiratory distress.     Chest: Breasts: Normal appearance, no nipple discharge, and no skin changes. Palpation of breasts and axillae: No palpable mass and no axillary lymphadenopathy.    Abdomen: Soft, non-tender.     Incision: midline 2mm separation of skin, 2 mm deep, with a suture within the skin separation. No erythema, nontender, no fluctuance, no signs of infection.     Genitourinary:   External genitalia: Normal appearance.  No inguinal lymphadenopathy.   Urethra: Normal. Bladder: Normal on palpation.   Vagina: Normal.   Uterus/Adnexa: Normal size, mobile uterus. Nontender, no masses palpated in adnexa  Inspection of perianal area: Normal.    Musculoskeletal: No joint swelling seen, normal movements of all extremities.    Skin: Normal skin color and pigmentation, normal skin turgor, and no rash.    Psychiatric: Alert and oriented x 3. Affect normal to patient's baseline. Mood: Appropriate.     Yulisa Sheht MD      "

## 2024-10-03 ENCOUNTER — APPOINTMENT (OUTPATIENT)
Dept: OBSTETRICS AND GYNECOLOGY | Facility: CLINIC | Age: 34
End: 2024-10-03
Payer: COMMERCIAL

## (undated) DEVICE — DRESSING, MEPILEX BORDER, POST-OP, 10 X 4 IN

## (undated) DEVICE — PAD, GROUNDING, ELECTROSURGICAL, W/9 FT CABLE, POLYHESIVE II, ADULT, LF

## (undated) DEVICE — DRAPE PACK, CESAREAN SECTION, CUSTOM, GEAUGA

## (undated) DEVICE — KIT, MINOR, DOUBLE BASIN

## (undated) DEVICE — SPONGE, GAUZE, XRAY DECT, 16 PLY, 4 X 4, W/MASTER DMT,STERILE

## (undated) DEVICE — GLOVE, SURGEON, PREMIERPRO PI, SZ-5.5, PF, WH

## (undated) DEVICE — SUTURE, VICRYL, 4-0, 27 IN, KS, UNDYED

## (undated) DEVICE — TOWEL PACK, STERILE, 4/PACK, BLUE

## (undated) DEVICE — SUTURE, VICRYL 0, 36 IN, CT-1, VIOLET

## (undated) DEVICE — PREP TRAY, SKIN, DRY, W/GLOVES

## (undated) DEVICE — APPLICATOR, PREP, CHLORAPREP, W/ORANGE TINT, 3ML